# Patient Record
Sex: FEMALE | Race: WHITE | NOT HISPANIC OR LATINO | Employment: UNEMPLOYED | URBAN - METROPOLITAN AREA
[De-identification: names, ages, dates, MRNs, and addresses within clinical notes are randomized per-mention and may not be internally consistent; named-entity substitution may affect disease eponyms.]

---

## 2017-03-03 DIAGNOSIS — E78.2 MIXED HYPERLIPIDEMIA: Primary | ICD-10-CM

## 2017-03-03 DIAGNOSIS — E03.9 HYPOTHYROIDISM, UNSPECIFIED TYPE: ICD-10-CM

## 2017-03-07 LAB
ALBUMIN SERPL-MCNC: 4.4 G/DL (ref 3.5–5.2)
ALBUMIN/GLOB SERPL: 2 G/DL
ALP SERPL-CCNC: 52 U/L (ref 39–117)
ALT SERPL-CCNC: 22 U/L (ref 1–33)
AST SERPL-CCNC: 21 U/L (ref 1–32)
BILIRUB SERPL-MCNC: 0.3 MG/DL (ref 0.1–1.2)
BUN SERPL-MCNC: 20 MG/DL (ref 6–20)
BUN/CREAT SERPL: 24.1 (ref 7–25)
CALCIUM SERPL-MCNC: 9.6 MG/DL (ref 8.6–10.5)
CHLORIDE SERPL-SCNC: 101 MMOL/L (ref 98–107)
CHOLEST SERPL-MCNC: 197 MG/DL (ref 0–200)
CHOLEST/HDLC SERPL: 2.74 {RATIO}
CO2 SERPL-SCNC: 25.9 MMOL/L (ref 22–29)
CREAT SERPL-MCNC: 0.83 MG/DL (ref 0.57–1)
GLOBULIN SER CALC-MCNC: 2.2 GM/DL
GLUCOSE SERPL-MCNC: 117 MG/DL (ref 65–99)
HDLC SERPL-MCNC: 72 MG/DL (ref 40–60)
LDLC SERPL CALC-MCNC: 104 MG/DL (ref 0–100)
POTASSIUM SERPL-SCNC: 4.8 MMOL/L (ref 3.5–5.2)
PROT SERPL-MCNC: 6.6 G/DL (ref 6–8.5)
SODIUM SERPL-SCNC: 141 MMOL/L (ref 136–145)
T3FREE SERPL-MCNC: 2.9 PG/ML (ref 2–4.4)
T4 FREE SERPL-MCNC: 1.6 NG/DL (ref 0.93–1.7)
TRIGL SERPL-MCNC: 106 MG/DL (ref 0–150)
TSH SERPL DL<=0.005 MIU/L-ACNC: 0.44 MIU/ML (ref 0.27–4.2)
VLDLC SERPL CALC-MCNC: 21.2 MG/DL (ref 5–40)

## 2017-03-08 RX ORDER — OMEGA-3 ACID ETHYL ESTERS 1 G
CAPSULE ORAL
Qty: 360 CAPSULE | Refills: 0 | Status: SHIPPED | OUTPATIENT
Start: 2017-03-08 | End: 2017-07-01 | Stop reason: SDUPTHER

## 2017-03-08 RX ORDER — LISINOPRIL AND HYDROCHLOROTHIAZIDE 12.5; 1 MG/1; MG/1
TABLET ORAL
Qty: 90 TABLET | Refills: 0 | Status: SHIPPED | OUTPATIENT
Start: 2017-03-08 | End: 2017-08-11 | Stop reason: SDUPTHER

## 2017-03-10 ENCOUNTER — OFFICE VISIT (OUTPATIENT)
Dept: FAMILY MEDICINE CLINIC | Facility: CLINIC | Age: 57
End: 2017-03-10

## 2017-03-10 VITALS
WEIGHT: 214.8 LBS | HEART RATE: 71 BPM | HEIGHT: 62 IN | BODY MASS INDEX: 39.53 KG/M2 | SYSTOLIC BLOOD PRESSURE: 134 MMHG | DIASTOLIC BLOOD PRESSURE: 80 MMHG | OXYGEN SATURATION: 100 % | TEMPERATURE: 97.7 F

## 2017-03-10 DIAGNOSIS — E11.9 DIABETES MELLITUS WITHOUT COMPLICATION (HCC): ICD-10-CM

## 2017-03-10 DIAGNOSIS — E78.1 PRIMARY HYPERTRIGLYCERIDEMIA: ICD-10-CM

## 2017-03-10 DIAGNOSIS — I10 ESSENTIAL HYPERTENSION: ICD-10-CM

## 2017-03-10 DIAGNOSIS — E78.2 MIXED HYPERLIPIDEMIA: Primary | ICD-10-CM

## 2017-03-10 DIAGNOSIS — E03.8 OTHER SPECIFIED HYPOTHYROIDISM: ICD-10-CM

## 2017-03-10 PROCEDURE — 99214 OFFICE O/P EST MOD 30 MIN: CPT | Performed by: NURSE PRACTITIONER

## 2017-03-10 NOTE — PROGRESS NOTES
Subjective   Tatiana Turner is a 56 y.o. female.     History of Present Illness     6 month f/u    HLD:  LDL near optimum.  Livalo at the 4 mg dose is only statin she has been able to tolerate.    Hypothyroidism:  All levels wnl on current dose of Synthroid 75 mcg.    DM:  Fasting glucose 117.  Last A1-c 5.6%.      HTN:  Well controlled with medications.    Unfortunately, she has regained 16 lbs that she lost last year with the aid of Qsymia.  She is upset about this.  Would like to consider another medication to assist in her efforts.      The following portions of the patient's history were reviewed and updated as appropriate: allergies, current medications, past family history, past medical history, past social history, past surgical history and problem list.    Review of Systems   Respiratory: Negative.    Cardiovascular: Negative.    Psychiatric/Behavioral: Positive for dysphoric mood.   All other systems reviewed and are negative.      Objective   Physical Exam   Constitutional: She appears well-developed and well-nourished.   Cardiovascular: Normal rate, regular rhythm, S1 normal, S2 normal and normal heart sounds.    No murmur heard.  Pulses:       Carotid pulses are 2+ on the left side.  Pulmonary/Chest: Effort normal and breath sounds normal.   Neurological: She is alert.   Psychiatric: She has a normal mood and affect.   Nursing note and vitals reviewed.      Assessment/Plan   Tatiana was seen today for follow-up.    Diagnoses and all orders for this visit:    Mixed hyperlipidemia    Essential hypertension    Primary hypertriglyceridemia    Diabetes mellitus without complication    Other specified hypothyroidism    Other orders  -     naltrexone-bupropion ER (CONTRAVE) 8-90 MG tablet; Wk 1: 1 tab daily, Wk 2: 1 tab twice a day, Wk 3: 2 tabs in AM, 1 tab in PM, Wk 4: 2 tabs twice a day, Maintenance dose: 2 tabs twice daily.      I reviewed her lab results with her.  No changes at this time.    Discussed  options for weight loss meds.  She has chosen to try Contrave.  I discussed possible adverse side effects.      RTC 6 weeks for f/u weight loss.  RTC 6 months fbw/follow up appt

## 2017-04-17 RX ORDER — PITAVASTATIN CALCIUM 4.18 MG/1
TABLET, FILM COATED ORAL
Qty: 90 TABLET | Refills: 1 | Status: SHIPPED | OUTPATIENT
Start: 2017-04-17 | End: 2017-10-16 | Stop reason: SDUPTHER

## 2017-04-17 RX ORDER — FENOFIBRATE 145 MG/1
TABLET, COATED ORAL
Qty: 90 TABLET | Refills: 1 | Status: SHIPPED | OUTPATIENT
Start: 2017-04-17 | End: 2017-10-16 | Stop reason: SDUPTHER

## 2017-04-17 RX ORDER — FELODIPINE 2.5 MG/1
TABLET, EXTENDED RELEASE ORAL
Qty: 90 TABLET | Refills: 1 | Status: SHIPPED | OUTPATIENT
Start: 2017-04-17 | End: 2017-10-16 | Stop reason: SDUPTHER

## 2017-04-17 RX ORDER — METFORMIN HYDROCHLORIDE 500 MG/1
TABLET, EXTENDED RELEASE ORAL
Qty: 90 TABLET | Refills: 1 | Status: SHIPPED | OUTPATIENT
Start: 2017-04-17 | End: 2017-12-01 | Stop reason: SDUPTHER

## 2017-05-05 ENCOUNTER — OFFICE VISIT (OUTPATIENT)
Dept: FAMILY MEDICINE CLINIC | Facility: CLINIC | Age: 57
End: 2017-05-05

## 2017-05-05 VITALS
HEIGHT: 62 IN | OXYGEN SATURATION: 100 % | TEMPERATURE: 97.9 F | SYSTOLIC BLOOD PRESSURE: 124 MMHG | BODY MASS INDEX: 39.27 KG/M2 | HEART RATE: 68 BPM | DIASTOLIC BLOOD PRESSURE: 70 MMHG | WEIGHT: 213.4 LBS

## 2017-05-05 DIAGNOSIS — Z71.3 ENCOUNTER FOR WEIGHT LOSS COUNSELING: ICD-10-CM

## 2017-05-05 DIAGNOSIS — E66.9 OBESITY WITH BODY MASS INDEX OF 30.0-39.9: Primary | ICD-10-CM

## 2017-05-05 PROCEDURE — 99213 OFFICE O/P EST LOW 20 MIN: CPT | Performed by: NURSE PRACTITIONER

## 2017-05-05 RX ORDER — PHENDIMETRAZINE TARTRATE 35 MG/1
1 TABLET ORAL 2 TIMES DAILY
Qty: 60 EACH | Refills: 0 | Status: SHIPPED | OUTPATIENT
Start: 2017-05-05 | End: 2017-05-26 | Stop reason: SDUPTHER

## 2017-05-26 ENCOUNTER — OFFICE VISIT (OUTPATIENT)
Dept: FAMILY MEDICINE CLINIC | Facility: CLINIC | Age: 57
End: 2017-05-26

## 2017-05-26 VITALS
DIASTOLIC BLOOD PRESSURE: 76 MMHG | TEMPERATURE: 97.7 F | WEIGHT: 212 LBS | OXYGEN SATURATION: 99 % | BODY MASS INDEX: 39.01 KG/M2 | HEIGHT: 62 IN | SYSTOLIC BLOOD PRESSURE: 122 MMHG | HEART RATE: 72 BPM

## 2017-05-26 DIAGNOSIS — E66.01 MORBID OBESITY, UNSPECIFIED OBESITY TYPE (HCC): Primary | ICD-10-CM

## 2017-05-26 DIAGNOSIS — Z71.3 ENCOUNTER FOR WEIGHT LOSS COUNSELING: ICD-10-CM

## 2017-05-26 PROCEDURE — 99213 OFFICE O/P EST LOW 20 MIN: CPT | Performed by: NURSE PRACTITIONER

## 2017-05-26 RX ORDER — PHENDIMETRAZINE TARTRATE 35 MG/1
1 TABLET ORAL 2 TIMES DAILY
Qty: 60 EACH | Refills: 0 | Status: SHIPPED | OUTPATIENT
Start: 2017-05-26 | End: 2017-07-14

## 2017-07-03 RX ORDER — OMEGA-3 ACID ETHYL ESTERS 1 G
CAPSULE ORAL
Qty: 360 CAPSULE | Refills: 3 | Status: SHIPPED | OUTPATIENT
Start: 2017-07-03 | End: 2018-06-14

## 2017-07-03 RX ORDER — LEVOTHYROXINE SODIUM 75 MCG
TABLET ORAL
Qty: 90 TABLET | Refills: 3 | Status: SHIPPED | OUTPATIENT
Start: 2017-07-03 | End: 2017-12-01 | Stop reason: SDUPTHER

## 2017-07-14 ENCOUNTER — OFFICE VISIT (OUTPATIENT)
Dept: FAMILY MEDICINE CLINIC | Facility: CLINIC | Age: 57
End: 2017-07-14

## 2017-07-14 ENCOUNTER — TELEPHONE (OUTPATIENT)
Dept: FAMILY MEDICINE CLINIC | Facility: CLINIC | Age: 57
End: 2017-07-14

## 2017-07-14 ENCOUNTER — HOSPITAL ENCOUNTER (OUTPATIENT)
Dept: GENERAL RADIOLOGY | Facility: HOSPITAL | Age: 57
Discharge: HOME | End: 2017-07-14
Admitting: NURSE PRACTITIONER

## 2017-07-14 ENCOUNTER — TRANSCRIBE ORDERS (OUTPATIENT)
Dept: ADMINISTRATIVE | Facility: HOSPITAL | Age: 57
End: 2017-07-14

## 2017-07-14 VITALS
BODY MASS INDEX: 39.01 KG/M2 | HEART RATE: 67 BPM | HEIGHT: 62 IN | DIASTOLIC BLOOD PRESSURE: 80 MMHG | SYSTOLIC BLOOD PRESSURE: 124 MMHG | WEIGHT: 212 LBS | TEMPERATURE: 97.8 F | OXYGEN SATURATION: 99 %

## 2017-07-14 DIAGNOSIS — M79.672 LEFT FOOT PAIN: ICD-10-CM

## 2017-07-14 DIAGNOSIS — E66.9 OBESITY WITH BODY MASS INDEX OF 30.0-39.9: Primary | ICD-10-CM

## 2017-07-14 DIAGNOSIS — E04.1 THYROID NODULE: Primary | ICD-10-CM

## 2017-07-14 DIAGNOSIS — Z71.3 ENCOUNTER FOR WEIGHT LOSS COUNSELING: ICD-10-CM

## 2017-07-14 DIAGNOSIS — M77.32 CALCANEAL SPUR OF FOOT, LEFT: Primary | ICD-10-CM

## 2017-07-14 DIAGNOSIS — M79.672 PAIN OF LEFT HEEL: ICD-10-CM

## 2017-07-14 PROCEDURE — 99213 OFFICE O/P EST LOW 20 MIN: CPT | Performed by: NURSE PRACTITIONER

## 2017-07-14 PROCEDURE — 73630 X-RAY EXAM OF FOOT: CPT

## 2017-07-14 RX ORDER — MELOXICAM 15 MG/1
15 TABLET ORAL DAILY
Qty: 30 TABLET | Refills: 0 | Status: SHIPPED | OUTPATIENT
Start: 2017-07-14 | End: 2018-06-14

## 2017-07-14 NOTE — TELEPHONE ENCOUNTER
----- Message from JOSE Cortez sent at 7/14/2017  3:45 PM EDT -----  Call pt.  X-ray did show a bone spur on her heel bone.  I am referring her to orthopedics.

## 2017-07-14 NOTE — PROGRESS NOTES
Subjective   Tatiana Turner is a 56 y.o. female.     History of Present Illness       Here for weight loss management.  She has not lost any weight at all on appetite suppressant phendimetrazine.  She has tried/failed on Qsymia (all doses), Contrave, and now phendimetrazine.      C/o 3 day hx left heel pain.  No known injury.  Taking aleve once day--having no effect on pain.      The following portions of the patient's history were reviewed and updated as appropriate: allergies, current medications, past family history, past medical history, past social history, past surgical history and problem list.    Review of Systems   Constitutional: Negative for unexpected weight change.   Musculoskeletal:        Left heel pain x 3 days   All other systems reviewed and are negative.      Objective   Physical Exam   Constitutional: Vital signs are normal. She appears well-developed and well-nourished.  Non-toxic appearance.   Cardiovascular: Normal rate.    Pulmonary/Chest: Effort normal.   Musculoskeletal:        Left foot: There is tenderness. There is normal range of motion, no swelling and no deformity.   Tender to palpation later aspects of left heel and over calcaneous.  Mildly antalgic gait.     Neurological: She is alert.   Psychiatric: She has a normal mood and affect.   Nursing note and vitals reviewed.      Assessment/Plan   Tatiana was seen today for follow-up.    Diagnoses and all orders for this visit:    Obesity with body mass index of 30.0-39.9    Encounter for weight loss counseling    Left foot pain  -     XR Foot 3+ View Left; Future    Other orders  -     meloxicam (MOBIC) 15 MG tablet; Take 1 tablet by mouth Daily. 1 po q day with a meal        D/c Contrave.  I explained to patient I have nothing more to offer her for weight loss.  She has declined referral to bariatrics in past--still declines.  I recommended weight watchers.      Pending x-ray results will refer to ortho or have her f/u with me--which  ever is appropriate.

## 2017-08-11 RX ORDER — LISINOPRIL AND HYDROCHLOROTHIAZIDE 12.5; 1 MG/1; MG/1
TABLET ORAL
Qty: 90 TABLET | Refills: 3 | Status: SHIPPED | OUTPATIENT
Start: 2017-08-11 | End: 2017-12-01 | Stop reason: SDUPTHER

## 2017-08-22 RX ORDER — BUDESONIDE AND FORMOTEROL FUMARATE DIHYDRATE 80; 4.5 UG/1; UG/1
AEROSOL RESPIRATORY (INHALATION)
Qty: 6.9 G | Refills: 3 | Status: SHIPPED | OUTPATIENT
Start: 2017-08-22 | End: 2017-12-01 | Stop reason: SDUPTHER

## 2017-09-06 DIAGNOSIS — E78.5 HYPERLIPIDEMIA, UNSPECIFIED HYPERLIPIDEMIA TYPE: ICD-10-CM

## 2017-09-06 DIAGNOSIS — E11.51 DIABETES MELLITUS WITH PERIPHERAL VASCULAR DISEASE (HCC): Primary | ICD-10-CM

## 2017-09-06 DIAGNOSIS — E55.9 VITAMIN D DEFICIENCY: ICD-10-CM

## 2017-09-06 DIAGNOSIS — E03.9 HYPOTHYROIDISM, UNSPECIFIED TYPE: Primary | ICD-10-CM

## 2017-09-11 ENCOUNTER — RESULTS ENCOUNTER (OUTPATIENT)
Dept: FAMILY MEDICINE CLINIC | Facility: CLINIC | Age: 57
End: 2017-09-11

## 2017-09-11 DIAGNOSIS — E55.9 VITAMIN D DEFICIENCY: ICD-10-CM

## 2017-09-11 DIAGNOSIS — E11.51 DIABETES MELLITUS WITH PERIPHERAL VASCULAR DISEASE (HCC): ICD-10-CM

## 2017-09-11 DIAGNOSIS — E03.9 HYPOTHYROIDISM, UNSPECIFIED TYPE: ICD-10-CM

## 2017-09-11 DIAGNOSIS — E78.5 HYPERLIPIDEMIA, UNSPECIFIED HYPERLIPIDEMIA TYPE: ICD-10-CM

## 2017-09-12 LAB
25(OH)D3+25(OH)D2 SERPL-MCNC: 49.3 NG/ML (ref 30–100)
ALBUMIN SERPL-MCNC: 4.5 G/DL (ref 3.5–5.2)
ALBUMIN/GLOB SERPL: 2 G/DL
ALP SERPL-CCNC: 49 U/L (ref 39–117)
ALT SERPL-CCNC: 26 U/L (ref 1–33)
AST SERPL-CCNC: 25 U/L (ref 1–32)
BILIRUB SERPL-MCNC: 0.4 MG/DL (ref 0.1–1.2)
BUN SERPL-MCNC: 21 MG/DL (ref 6–20)
BUN/CREAT SERPL: 22.6 (ref 7–25)
CALCIUM SERPL-MCNC: 10 MG/DL (ref 8.6–10.5)
CHLORIDE SERPL-SCNC: 101 MMOL/L (ref 98–107)
CHOLEST SERPL-MCNC: 178 MG/DL (ref 0–200)
CHOLEST/HDLC SERPL: 2.54 {RATIO}
CO2 SERPL-SCNC: 27.9 MMOL/L (ref 22–29)
CREAT SERPL-MCNC: 0.93 MG/DL (ref 0.57–1)
GLOBULIN SER CALC-MCNC: 2.3 GM/DL
GLUCOSE SERPL-MCNC: 116 MG/DL (ref 65–99)
HBA1C MFR BLD: 5.92 % (ref 4.8–5.6)
HDLC SERPL-MCNC: 70 MG/DL (ref 40–60)
LDLC SERPL CALC-MCNC: 81 MG/DL (ref 0–100)
POTASSIUM SERPL-SCNC: 4.8 MMOL/L (ref 3.5–5.2)
PROT SERPL-MCNC: 6.8 G/DL (ref 6–8.5)
SODIUM SERPL-SCNC: 141 MMOL/L (ref 136–145)
T3FREE SERPL-MCNC: 2.5 PG/ML (ref 2–4.4)
T4 FREE SERPL-MCNC: 1.34 NG/DL (ref 0.93–1.7)
TRIGL SERPL-MCNC: 135 MG/DL (ref 0–150)
TSH SERPL DL<=0.005 MIU/L-ACNC: 4.91 MIU/ML (ref 0.27–4.2)
VLDLC SERPL CALC-MCNC: 27 MG/DL (ref 5–40)

## 2017-09-14 ENCOUNTER — OFFICE VISIT (OUTPATIENT)
Dept: FAMILY MEDICINE CLINIC | Facility: CLINIC | Age: 57
End: 2017-09-14

## 2017-09-14 VITALS
BODY MASS INDEX: 39.93 KG/M2 | DIASTOLIC BLOOD PRESSURE: 78 MMHG | OXYGEN SATURATION: 99 % | WEIGHT: 217 LBS | HEART RATE: 56 BPM | HEIGHT: 62 IN | SYSTOLIC BLOOD PRESSURE: 124 MMHG | TEMPERATURE: 97.9 F

## 2017-09-14 DIAGNOSIS — I10 ESSENTIAL HYPERTENSION: ICD-10-CM

## 2017-09-14 DIAGNOSIS — E11.9 DIABETES MELLITUS WITHOUT COMPLICATION (HCC): ICD-10-CM

## 2017-09-14 DIAGNOSIS — E66.9 OBESITY WITH BODY MASS INDEX OF 30.0-39.9: ICD-10-CM

## 2017-09-14 DIAGNOSIS — E78.5 HYPERLIPIDEMIA, UNSPECIFIED HYPERLIPIDEMIA TYPE: Primary | ICD-10-CM

## 2017-09-14 DIAGNOSIS — J45.40 MODERATE PERSISTENT ASTHMA WITHOUT COMPLICATION: ICD-10-CM

## 2017-09-14 DIAGNOSIS — E03.8 OTHER SPECIFIED HYPOTHYROIDISM: ICD-10-CM

## 2017-09-14 DIAGNOSIS — E78.1 PRIMARY HYPERTRIGLYCERIDEMIA: ICD-10-CM

## 2017-09-14 PROCEDURE — 99214 OFFICE O/P EST MOD 30 MIN: CPT | Performed by: NURSE PRACTITIONER

## 2017-09-14 NOTE — PROGRESS NOTES
Subjective   Tatiana Turner is a 56 y.o. female.     History of Present Illness     6 month f/u.    HLD, HTN, DM all stable on medications.  Hypothyroidism:  TSH elevated.  Admits to not taking synthroid consistently.  Asthma:  Stable.  Taking symbicort.     Obesity:  Weight up 5 lbs.  BMI 39.69.    The following portions of the patient's history were reviewed and updated as appropriate: allergies, current medications, past family history, past medical history, past social history, past surgical history and problem list.    Review of Systems   Respiratory: Negative.    Cardiovascular: Negative.    All other systems reviewed and are negative.      Objective   Physical Exam   Constitutional: Vital signs are normal. She appears well-developed and well-nourished.   Cardiovascular: Normal rate, regular rhythm, S1 normal, S2 normal and normal heart sounds.    No murmur heard.  Pulses:       Carotid pulses are 2+ on the right side, and 2+ on the left side.  No carotid bruits   Pulmonary/Chest: Effort normal and breath sounds normal.   Neurological: She is alert.   Psychiatric: She has a normal mood and affect.   Nursing note and vitals reviewed.      Assessment/Plan   Tatiana was seen today for follow-up.    Diagnoses and all orders for this visit:    Hyperlipidemia, unspecified hyperlipidemia type    Essential hypertension    Primary hypertriglyceridemia    Diabetes mellitus without complication    Other specified hypothyroidism    Moderate persistent asthma without complication    Obesity with body mass index of 30.0-39.9        I reviewed her lab results with her.  Again, discussed weight loss, diet/exercise.    Advised to take synthroid daily/consistently.    RTC 6 months    Adv to get flu vaccine.

## 2017-10-13 ENCOUNTER — HOSPITAL ENCOUNTER (OUTPATIENT)
Dept: ULTRASOUND IMAGING | Facility: HOSPITAL | Age: 57
Discharge: HOME | End: 2017-10-13
Attending: OTOLARYNGOLOGY | Admitting: OTOLARYNGOLOGY

## 2017-10-13 DIAGNOSIS — E04.1 THYROID NODULE: ICD-10-CM

## 2017-10-13 PROCEDURE — 76536 US EXAM OF HEAD AND NECK: CPT

## 2017-10-13 RX ORDER — OMEGA-3-ACID ETHYL ESTERS 1 G/1
2 CAPSULE, LIQUID FILLED ORAL 2 TIMES DAILY
Qty: 360 CAPSULE | Refills: 1 | Status: SHIPPED | OUTPATIENT
Start: 2017-10-13 | End: 2017-12-01 | Stop reason: SDUPTHER

## 2017-10-17 RX ORDER — FENOFIBRATE 145 MG/1
TABLET, COATED ORAL
Qty: 90 TABLET | Refills: 2 | Status: SHIPPED | OUTPATIENT
Start: 2017-10-17 | End: 2017-12-01 | Stop reason: SDUPTHER

## 2017-10-17 RX ORDER — FELODIPINE 2.5 MG/1
TABLET, EXTENDED RELEASE ORAL
Qty: 90 TABLET | Refills: 2 | Status: SHIPPED | OUTPATIENT
Start: 2017-10-17 | End: 2017-12-01 | Stop reason: SDUPTHER

## 2017-10-17 RX ORDER — PITAVASTATIN CALCIUM 4.18 MG/1
TABLET, FILM COATED ORAL
Qty: 90 TABLET | Refills: 2 | Status: SHIPPED | OUTPATIENT
Start: 2017-10-17 | End: 2017-12-01 | Stop reason: SDUPTHER

## 2017-10-24 ENCOUNTER — CLINICAL SUPPORT (OUTPATIENT)
Dept: FAMILY MEDICINE CLINIC | Facility: CLINIC | Age: 57
End: 2017-10-24

## 2017-10-24 PROCEDURE — 90686 IIV4 VACC NO PRSV 0.5 ML IM: CPT | Performed by: NURSE PRACTITIONER

## 2017-10-24 PROCEDURE — 90471 IMMUNIZATION ADMIN: CPT | Performed by: NURSE PRACTITIONER

## 2017-11-09 ENCOUNTER — APPOINTMENT (OUTPATIENT)
Dept: WOMENS IMAGING | Facility: HOSPITAL | Age: 57
End: 2017-11-09

## 2017-11-09 PROCEDURE — 77067 SCR MAMMO BI INCL CAD: CPT | Performed by: RADIOLOGY

## 2017-11-09 PROCEDURE — 77063 BREAST TOMOSYNTHESIS BI: CPT | Performed by: RADIOLOGY

## 2017-12-01 ENCOUNTER — OFFICE VISIT (OUTPATIENT)
Dept: FAMILY MEDICINE CLINIC | Facility: CLINIC | Age: 57
End: 2017-12-01

## 2017-12-01 VITALS
DIASTOLIC BLOOD PRESSURE: 76 MMHG | HEART RATE: 74 BPM | OXYGEN SATURATION: 99 % | HEIGHT: 62 IN | SYSTOLIC BLOOD PRESSURE: 124 MMHG | TEMPERATURE: 98.2 F | WEIGHT: 214 LBS | BODY MASS INDEX: 39.38 KG/M2

## 2017-12-01 DIAGNOSIS — E78.1 PRIMARY HYPERTRIGLYCERIDEMIA: ICD-10-CM

## 2017-12-01 DIAGNOSIS — I10 ESSENTIAL HYPERTENSION: Primary | ICD-10-CM

## 2017-12-01 DIAGNOSIS — E78.5 HYPERLIPIDEMIA, UNSPECIFIED HYPERLIPIDEMIA TYPE: ICD-10-CM

## 2017-12-01 DIAGNOSIS — J45.40 MODERATE PERSISTENT ASTHMA, UNSPECIFIED WHETHER COMPLICATED: ICD-10-CM

## 2017-12-01 PROCEDURE — 99213 OFFICE O/P EST LOW 20 MIN: CPT | Performed by: NURSE PRACTITIONER

## 2017-12-01 RX ORDER — LISINOPRIL AND HYDROCHLOROTHIAZIDE 12.5; 1 MG/1; MG/1
1 TABLET ORAL DAILY
Qty: 90 TABLET | Refills: 3 | Status: SHIPPED | OUTPATIENT
Start: 2017-12-01 | End: 2018-07-25 | Stop reason: SDUPTHER

## 2017-12-01 RX ORDER — LEVOTHYROXINE SODIUM 75 MCG
75 TABLET ORAL DAILY
Qty: 90 TABLET | Refills: 3 | Status: SHIPPED | OUTPATIENT
Start: 2017-12-01 | End: 2018-06-25

## 2017-12-01 RX ORDER — OMEGA-3-ACID ETHYL ESTERS 1 G/1
2 CAPSULE, LIQUID FILLED ORAL 2 TIMES DAILY
Qty: 360 CAPSULE | Refills: 3 | Status: SHIPPED | OUTPATIENT
Start: 2017-12-01 | End: 2018-07-25 | Stop reason: SDUPTHER

## 2017-12-01 RX ORDER — METFORMIN HYDROCHLORIDE 500 MG/1
500 TABLET, EXTENDED RELEASE ORAL
Qty: 90 TABLET | Refills: 1 | Status: SHIPPED | OUTPATIENT
Start: 2017-12-01 | End: 2018-07-25 | Stop reason: SDUPTHER

## 2017-12-01 RX ORDER — FELODIPINE 2.5 MG/1
2.5 TABLET, EXTENDED RELEASE ORAL DAILY
Qty: 90 TABLET | Refills: 3 | Status: SHIPPED | OUTPATIENT
Start: 2017-12-01 | End: 2018-06-14

## 2017-12-01 RX ORDER — BUDESONIDE AND FORMOTEROL FUMARATE DIHYDRATE 80; 4.5 UG/1; UG/1
AEROSOL RESPIRATORY (INHALATION)
Qty: 3 INHALER | Refills: 3 | Status: SHIPPED | OUTPATIENT
Start: 2017-12-01 | End: 2018-07-25 | Stop reason: SDUPTHER

## 2017-12-01 RX ORDER — FENOFIBRATE 145 MG/1
145 TABLET, COATED ORAL DAILY
Qty: 90 TABLET | Refills: 3 | Status: SHIPPED | OUTPATIENT
Start: 2017-12-01 | End: 2018-06-14

## 2017-12-01 NOTE — PROGRESS NOTES
Subjective   Tatiana Turner is a 57 y.o. female.     History of Present Illness   Here for med refills.  Labs are up to date.    Doing well.    The following portions of the patient's history were reviewed and updated as appropriate: allergies, current medications, past family history, past medical history, past social history, past surgical history and problem list.    Review of Systems   Respiratory: Negative.    Cardiovascular: Negative.    Endocrine: Negative.    All other systems reviewed and are negative.      Objective   Physical Exam   Constitutional: Vital signs are normal. She appears well-developed and well-nourished.  Non-toxic appearance.   Cardiovascular: Normal rate.    Pulmonary/Chest: Effort normal.   Neurological: She is alert.   Psychiatric: She has a normal mood and affect.   Nursing note and vitals reviewed.      Assessment/Plan   Tatiana was seen today for follow-up.    Diagnoses and all orders for this visit:    Essential hypertension    Hyperlipidemia, unspecified hyperlipidemia type    Primary hypertriglyceridemia    Moderate persistent asthma, unspecified whether complicated    Other orders  -     omega-3 acid ethyl esters (LOVAZA) 1 g capsule; Take 2 capsules by mouth 2 (Two) Times a Day.  -     budesonide-formoterol (SYMBICORT) 80-4.5 MCG/ACT inhaler; Symbicort 80-4.5 MCG/ACT Inhalation Aerosol inhale 2 puffs twice daily; 3; 3; 01-Apr-2014; Active  -     fenofibrate (TRICOR) 145 MG tablet; Take 1 tablet by mouth Daily.  -     lisinopril-hydrochlorothiazide (PRINZIDE,ZESTORETIC) 10-12.5 MG per tablet; Take 1 tablet by mouth Daily.  -     Pitavastatin Calcium (LIVALO) 4 MG tablet; Take 1 tablet by mouth Daily.  -     felodipine (PLENDIL) 2.5 MG 24 hr tablet; Take 1 tablet by mouth Daily.  -     metFORMIN ER (GLUCOPHAGE-XR) 500 MG 24 hr tablet; Take 1 tablet by mouth Daily With Breakfast.  -     SYNTHROID 75 MCG tablet; Take 1 tablet by mouth Daily.

## 2018-06-14 ENCOUNTER — TELEPHONE (OUTPATIENT)
Dept: FAMILY MEDICINE CLINIC | Facility: CLINIC | Age: 58
End: 2018-06-14

## 2018-06-14 ENCOUNTER — OFFICE VISIT (OUTPATIENT)
Dept: FAMILY MEDICINE CLINIC | Facility: CLINIC | Age: 58
End: 2018-06-14

## 2018-06-14 VITALS
TEMPERATURE: 98 F | HEART RATE: 73 BPM | SYSTOLIC BLOOD PRESSURE: 154 MMHG | WEIGHT: 233 LBS | HEIGHT: 62 IN | OXYGEN SATURATION: 98 % | DIASTOLIC BLOOD PRESSURE: 90 MMHG | BODY MASS INDEX: 42.88 KG/M2

## 2018-06-14 DIAGNOSIS — IMO0001 CLASS 3 OBESITY DUE TO EXCESS CALORIES WITH SERIOUS COMORBIDITY AND BODY MASS INDEX (BMI) OF 40.0 TO 44.9 IN ADULT: Primary | ICD-10-CM

## 2018-06-14 DIAGNOSIS — Z12.2 ENCOUNTER FOR SCREENING FOR LUNG CANCER: ICD-10-CM

## 2018-06-14 DIAGNOSIS — E03.8 OTHER SPECIFIED HYPOTHYROIDISM: ICD-10-CM

## 2018-06-14 DIAGNOSIS — E04.1 THYROID NODULE: ICD-10-CM

## 2018-06-14 DIAGNOSIS — Z87.891 HISTORY OF NICOTINE DEPENDENCE: ICD-10-CM

## 2018-06-14 DIAGNOSIS — E11.9 DIABETES MELLITUS WITHOUT COMPLICATION (HCC): ICD-10-CM

## 2018-06-14 DIAGNOSIS — I10 ESSENTIAL HYPERTENSION: ICD-10-CM

## 2018-06-14 DIAGNOSIS — E55.9 HYPOVITAMINOSIS D: ICD-10-CM

## 2018-06-14 PROBLEM — M72.2 PLANTAR FASCIITIS: Status: ACTIVE | Noted: 2018-06-14

## 2018-06-14 PROBLEM — M79.672 LEFT FOOT PAIN: Status: RESOLVED | Noted: 2017-07-14 | Resolved: 2018-06-14

## 2018-06-14 LAB
25(OH)D3+25(OH)D2 SERPL-MCNC: 50.2 NG/ML (ref 30–100)
BUN SERPL-MCNC: 18 MG/DL (ref 6–20)
BUN/CREAT SERPL: 21.4 (ref 7–25)
CALCIUM SERPL-MCNC: 9.8 MG/DL (ref 8.6–10.5)
CHLORIDE SERPL-SCNC: 103 MMOL/L (ref 98–107)
CO2 SERPL-SCNC: 27.5 MMOL/L (ref 22–29)
CREAT SERPL-MCNC: 0.84 MG/DL (ref 0.57–1)
GFR SERPLBLD CREATININE-BSD FMLA CKD-EPI: 70 ML/MIN/1.73
GFR SERPLBLD CREATININE-BSD FMLA CKD-EPI: 85 ML/MIN/1.73
GLUCOSE SERPL-MCNC: 123 MG/DL (ref 65–99)
HBA1C MFR BLD: 6.3 % (ref 4.8–5.6)
POTASSIUM SERPL-SCNC: 4.4 MMOL/L (ref 3.5–5.2)
SODIUM SERPL-SCNC: 145 MMOL/L (ref 136–145)
T4 FREE SERPL-MCNC: 1.32 NG/DL (ref 0.93–1.7)
TSH SERPL DL<=0.005 MIU/L-ACNC: 4.65 MIU/ML (ref 0.27–4.2)

## 2018-06-14 PROCEDURE — 99214 OFFICE O/P EST MOD 30 MIN: CPT | Performed by: FAMILY MEDICINE

## 2018-06-14 NOTE — TELEPHONE ENCOUNTER
I called pt insurance to see about coverage on CT Chest Low Dose. The plan does not cover the test with dx code (screening for lung cancer), that is the code that is required to be used for this test, therefore, pt insurance will not pay for it. Pt is aware and she has decided that she does not want this test anymore. Test cancelled.

## 2018-06-14 NOTE — PROGRESS NOTES
Tatiana Turner is a 57 y.o. female.     Chief Complaint   Patient presents with   • Hypertension     bp is runnig higher than norml due gained weight    • Hyperlipidemia     follow up  no complinas   • Hypothyroidism     follow up pt last time has thryroid number were bad would like recheck        HPI     Pt is a pleasant 57 y.o. YO female here for hypothyroidism, HLD, CAD, HTN and carotid artery disease.  PMH includes Carotid artery stenosis (FU by vascular), type 2 diabetes well controlled, hypertension well-controlled, hypothyroidism well-controlled, thyroid nodule stable Followed by ENT (secor), obesity, COPD (Seen by [facility]), hyperlipidemia.    Pressure well controlled, asymptomatic    Hyperlipidemia not well controlled.  She has not been able to tolerate statins except for low-dose pravastatin.  Vascular recommended baby aspirin, statin and diet at exercise.    Hypothyroidism well controlled, last TSH was in September and was normal.    ECG worsening, gained about 20 pounds in the last 3 years.  She does drink a beer nightly, carbs regularly especially test in the morning or potatoes with steak weekly.    The following portions of the patient's history were reviewed and updated as appropriate: allergies, current medications, past family history, past medical history, past social history, past surgical history and problem list.    Review of Systems   Constitutional: Negative for fever and unexpected weight change.   HENT: Negative for dental problem.    Respiratory: Negative for shortness of breath.    Cardiovascular: Negative for chest pain.   Gastrointestinal: Negative for blood in stool.   Genitourinary: Negative for dysuria.   Skin: Negative for rash.   Allergic/Immunologic: Negative for environmental allergies.   Neurological: Negative for syncope.   Psychiatric/Behavioral: The patient is not nervous/anxious.    All other systems reviewed and are negative.      Objective  Vitals:    06/14/18 1017   BP:  154/90   Pulse: 73   Temp: 98 °F (36.7 °C)   SpO2: 98%        Physical Exam   Constitutional: She is oriented to person, place, and time. She appears well-developed and well-nourished. No distress.   HENT:   Head: Normocephalic.   Nose: Nose normal.   Eyes: EOM are normal.   Cardiovascular: Normal rate, regular rhythm, normal heart sounds and intact distal pulses.    No murmur heard.  Pulmonary/Chest: Effort normal and breath sounds normal. No respiratory distress.   Musculoskeletal: Normal range of motion.   Neurological: She is alert and oriented to person, place, and time.   Skin: Skin is warm and dry. No rash noted.   Psychiatric: She has a normal mood and affect. Her behavior is normal. Judgment and thought content normal.   Nursing note and vitals reviewed.        Current Outpatient Prescriptions:   •  aspirin 81 MG tablet, Take 1 tablet by mouth daily., Disp: , Rfl:   •  budesonide-formoterol (SYMBICORT) 80-4.5 MCG/ACT inhaler, Symbicort 80-4.5 MCG/ACT Inhalation Aerosol inhale 2 puffs twice daily; 3; 3; 01-Apr-2014; Active, Disp: 3 inhaler, Rfl: 3  •  calcium citrate-vitamin D (CITRACAL+D) 315-200 MG-UNIT per tablet, Take 1 tablet by mouth daily., Disp: , Rfl:   •  Cholecalciferol (VITAMIN D) 1000 UNITS capsule, Take by mouth. Take as directed, Disp: , Rfl:   •  estradiol (VAGIFEM) 10 MCG tablet vaginal tablet, Insert into the vagina., Disp: , Rfl:   •  glucose blood test strip, daily. Use 1 strip daily, Disp: , Rfl:   •  Lancets (FREESTYLE) lancets, Use as directed, Disp: , Rfl:   •  lisinopril-hydrochlorothiazide (PRINZIDE,ZESTORETIC) 10-12.5 MG per tablet, Take 1 tablet by mouth Daily., Disp: 90 tablet, Rfl: 3  •  metFORMIN ER (GLUCOPHAGE-XR) 500 MG 24 hr tablet, Take 1 tablet by mouth Daily With Breakfast., Disp: 90 tablet, Rfl: 1  •  omega-3 acid ethyl esters (LOVAZA) 1 g capsule, Take 2 capsules by mouth 2 (Two) Times a Day., Disp: 360 capsule, Rfl: 3  •  Pitavastatin Calcium (LIVALO) 4 MG tablet, Take  1 tablet by mouth Daily., Disp: 90 tablet, Rfl: 3  •  SYNTHROID 75 MCG tablet, Take 1 tablet by mouth Daily., Disp: 90 tablet, Rfl: 3    Procedures    Lab Results (most recent)     None              Tatiana was seen today for hypertension, hyperlipidemia and hypothyroidism.    Diagnoses and all orders for this visit:    Class 3 obesity due to excess calories with serious comorbidity and body mass index (BMI) of 40.0 to 44.9 in adult    Diabetes mellitus without complication  -     Hemoglobin A1c  -     Basic Metabolic Panel    Essential hypertension  -     Basic Metabolic Panel    Other specified hypothyroidism  -     TSH  -     T4, free    Thyroid nodule  -     TSH  -     T4, free    Encounter for screening for lung cancer  -     CT chest low dose wo; Future    History of nicotine dependence    Hypovitaminosis D  -     Vitamin D 25 Hydroxy    Shows a pleasant 57-year-old female new to this office in new to me.  She is due for laboratory work as above.  We discussed diet and exercise, plan to eat a lower carb diet and increase activity to exercising 4 times weekly.  She also has a beer nightly, we discussed that this has lots of sugar.  Plan to decrease beer consumption.  Follow labs above.  CT scan for lung cancer screening as well.      Return in 1 month (on 7/14/2018), or if symptoms worsen or fail to improve, for Recheck htn obesity .      Dawna Abarca MD

## 2018-06-25 RX ORDER — LEVOTHYROXINE SODIUM 0.1 MG/1
100 TABLET ORAL DAILY
Qty: 90 TABLET | Refills: 3 | Status: SHIPPED | OUTPATIENT
Start: 2018-06-25 | End: 2018-08-27 | Stop reason: SDUPTHER

## 2018-07-13 ENCOUNTER — OFFICE VISIT (OUTPATIENT)
Dept: FAMILY MEDICINE CLINIC | Facility: CLINIC | Age: 58
End: 2018-07-13

## 2018-07-13 VITALS
WEIGHT: 236 LBS | TEMPERATURE: 98.5 F | DIASTOLIC BLOOD PRESSURE: 84 MMHG | BODY MASS INDEX: 43.43 KG/M2 | OXYGEN SATURATION: 98 % | HEIGHT: 62 IN | SYSTOLIC BLOOD PRESSURE: 136 MMHG | HEART RATE: 78 BPM

## 2018-07-13 DIAGNOSIS — E03.8 OTHER SPECIFIED HYPOTHYROIDISM: Primary | ICD-10-CM

## 2018-07-13 DIAGNOSIS — I10 ESSENTIAL HYPERTENSION: ICD-10-CM

## 2018-07-13 DIAGNOSIS — IMO0001 CLASS 3 OBESITY DUE TO EXCESS CALORIES WITH SERIOUS COMORBIDITY AND BODY MASS INDEX (BMI) OF 40.0 TO 44.9 IN ADULT: ICD-10-CM

## 2018-07-13 PROCEDURE — 99214 OFFICE O/P EST MOD 30 MIN: CPT | Performed by: FAMILY MEDICINE

## 2018-07-13 NOTE — PROGRESS NOTES
Tatiana Turner is a 57 y.o. female.     Chief Complaint   Patient presents with   • Hypertension     follow up is doing better bp with new higher dose   • Hypothyroidism   • Obesity       HPI     Pt is a pleasant 57 y.o. YO female here for HTN, hypothyroidism  and FU obesity.  PMH includes hypothyroidism, HLD, CAD, HTN and carotid artery disease.    HTN: controlled  - Currently treated with HCTZ Lisinopril   - Diagnosed years ago  - Asymptomatic, no chest pain, palpitation or SOA  - Last Cr of 0.8 6/2018  - Lifestyle measures atempted:     Hypothyroidism: TSH elevated at 4.6 6/4 and increased dose of levothyroxine.  She was feeling fatigued and had her dose increased to 100 mcg with improvement.  Due for repeat TSH in 4 weeks.    Obesity:  - Worsening, especially in the summer, she does not get out when it is hot.  She usually walks.  Her diet has not been very adherent.  She has intentions to lose weight.    The following portions of the patient's history were reviewed and updated as appropriate: allergies, current medications, past family history, past medical history, past social history, past surgical history and problem list.    Review of Systems   Constitutional: Negative for fever and unexpected weight change.   HENT: Negative for dental problem.    Respiratory: Negative for shortness of breath.    Cardiovascular: Negative for chest pain.   Gastrointestinal: Negative for blood in stool.   Genitourinary: Negative for dysuria.   Skin: Negative for rash.   Allergic/Immunologic: Negative for environmental allergies.   Neurological: Negative for syncope.   Psychiatric/Behavioral: The patient is not nervous/anxious.    All other systems reviewed and are negative.      Objective  Vitals:    07/13/18 1031   BP: 136/84   Pulse: 78   Temp: 98.5 °F (36.9 °C)   SpO2: 98%        Physical Exam   Constitutional: She is oriented to person, place, and time. She appears well-developed and well-nourished. No distress.    HENT:   Head: Normocephalic.   Nose: Nose normal.   Eyes: EOM are normal.   Cardiovascular: Normal rate, regular rhythm, normal heart sounds and intact distal pulses.    No murmur heard.  Pulmonary/Chest: Effort normal and breath sounds normal. No respiratory distress.   Musculoskeletal: Normal range of motion.   Neurological: She is alert and oriented to person, place, and time.   Skin: Skin is warm and dry. No rash noted.   Psychiatric: She has a normal mood and affect. Her behavior is normal. Judgment and thought content normal.   Nursing note and vitals reviewed.        Current Outpatient Prescriptions:   •  aspirin 81 MG tablet, Take 1 tablet by mouth daily., Disp: , Rfl:   •  budesonide-formoterol (SYMBICORT) 80-4.5 MCG/ACT inhaler, Symbicort 80-4.5 MCG/ACT Inhalation Aerosol inhale 2 puffs twice daily; 3; 3; 01-Apr-2014; Active, Disp: 3 inhaler, Rfl: 3  •  calcium citrate-vitamin D (CITRACAL+D) 315-200 MG-UNIT per tablet, Take 1 tablet by mouth daily., Disp: , Rfl:   •  Cholecalciferol (VITAMIN D) 1000 UNITS capsule, Take by mouth. Take as directed, Disp: , Rfl:   •  estradiol (VAGIFEM) 10 MCG tablet vaginal tablet, Insert into the vagina., Disp: , Rfl:   •  glucose blood test strip, daily. Use 1 strip daily, Disp: , Rfl:   •  Lancets (FREESTYLE) lancets, Use as directed, Disp: , Rfl:   •  levothyroxine (SYNTHROID) 100 MCG tablet, Take 1 tablet by mouth Daily., Disp: 90 tablet, Rfl: 3  •  lisinopril-hydrochlorothiazide (PRINZIDE,ZESTORETIC) 10-12.5 MG per tablet, Take 1 tablet by mouth Daily., Disp: 90 tablet, Rfl: 3  •  metFORMIN ER (GLUCOPHAGE-XR) 500 MG 24 hr tablet, Take 1 tablet by mouth Daily With Breakfast., Disp: 90 tablet, Rfl: 1  •  omega-3 acid ethyl esters (LOVAZA) 1 g capsule, Take 2 capsules by mouth 2 (Two) Times a Day., Disp: 360 capsule, Rfl: 3  •  Pitavastatin Calcium (LIVALO) 4 MG tablet, Take 1 tablet by mouth Daily., Disp: 90 tablet, Rfl: 3    Procedures    Lab Results (most recent)      None              Tatiana was seen today for hypertension, hypothyroidism and obesity.    Diagnoses and all orders for this visit:    Other specified hypothyroidism  -     TSH  -     T4, free    Essential hypertension    Class 3 obesity due to excess calories with serious comorbidity and body mass index (BMI) of 40.0 to 44.9 in adult (CMS/[facility])    Repeat thyroid labs as above in 4 weeks.  Blood pressure improving, no change to medicines.  She will continue checking her blood pressure at home.  Class III obesity worsening.  She has gained weight despite plan to decrease carbs and increase exercise.  She will consider getting a gym membership, we discussed the  someone to help her with weight lifting.  She does enjoy walking.  She also has ridden a bike in the past with improvement      Return in about 3 months (around 10/13/2018), or if symptoms worsen or fail to improve, for Annual physical (after September) .      Dawna Abarca MD

## 2018-07-25 RX ORDER — OMEGA-3-ACID ETHYL ESTERS 1 G/1
CAPSULE, LIQUID FILLED ORAL
Qty: 360 CAPSULE | Refills: 3 | Status: SHIPPED | OUTPATIENT
Start: 2018-07-25 | End: 2019-09-09 | Stop reason: SDUPTHER

## 2018-07-25 RX ORDER — BUDESONIDE AND FORMOTEROL FUMARATE DIHYDRATE 80; 4.5 UG/1; UG/1
AEROSOL RESPIRATORY (INHALATION)
Qty: 3 INHALER | Refills: 3 | Status: SHIPPED | OUTPATIENT
Start: 2018-07-25 | End: 2019-09-25 | Stop reason: SDUPTHER

## 2018-07-25 RX ORDER — METFORMIN HYDROCHLORIDE 500 MG/1
500 TABLET, EXTENDED RELEASE ORAL
Qty: 90 TABLET | Refills: 1 | Status: SHIPPED | OUTPATIENT
Start: 2018-07-25 | End: 2019-01-14 | Stop reason: SDUPTHER

## 2018-07-25 RX ORDER — LISINOPRIL AND HYDROCHLOROTHIAZIDE 12.5; 1 MG/1; MG/1
1 TABLET ORAL DAILY
Qty: 90 TABLET | Refills: 3 | Status: SHIPPED | OUTPATIENT
Start: 2018-07-25 | End: 2019-04-01 | Stop reason: SDUPTHER

## 2018-08-01 ENCOUNTER — TELEPHONE (OUTPATIENT)
Dept: FAMILY MEDICINE CLINIC | Facility: CLINIC | Age: 58
End: 2018-08-01

## 2018-08-01 NOTE — TELEPHONE ENCOUNTER
[facility] spoke with pt stating that they are unable to fill the Pitavastatin Calcium 4 mg tablet Rx. They need a new prescription sent over by Dr. Abarca. They only physician they have to authorize this by is NP, Katt Salinas.

## 2018-08-22 LAB
T4 FREE SERPL-MCNC: 1.57 NG/DL (ref 0.82–1.77)
TSH SERPL DL<=0.005 MIU/L-ACNC: 3.45 UIU/ML (ref 0.45–4.5)

## 2018-08-27 RX ORDER — LEVOTHYROXINE SODIUM 0.1 MG/1
100 TABLET ORAL DAILY
Qty: 90 TABLET | Refills: 3 | Status: SHIPPED | OUTPATIENT
Start: 2018-08-27 | End: 2019-01-02 | Stop reason: SDUPTHER

## 2018-10-01 ENCOUNTER — TRANSCRIBE ORDERS (OUTPATIENT)
Dept: ADMINISTRATIVE | Facility: HOSPITAL | Age: 58
End: 2018-10-01

## 2018-10-01 DIAGNOSIS — E04.1 THYROID NODULE: Primary | ICD-10-CM

## 2018-10-05 ENCOUNTER — HOSPITAL ENCOUNTER (OUTPATIENT)
Dept: ULTRASOUND IMAGING | Facility: HOSPITAL | Age: 58
Discharge: HOME | End: 2018-10-05
Attending: OTOLARYNGOLOGY | Admitting: OTOLARYNGOLOGY

## 2018-10-05 DIAGNOSIS — E04.1 THYROID NODULE: ICD-10-CM

## 2018-10-05 PROCEDURE — 76536 US EXAM OF HEAD AND NECK: CPT

## 2018-11-27 ENCOUNTER — APPOINTMENT (OUTPATIENT)
Dept: WOMENS IMAGING | Facility: HOSPITAL | Age: 58
End: 2018-11-27

## 2018-11-27 PROCEDURE — 77067 SCR MAMMO BI INCL CAD: CPT | Performed by: RADIOLOGY

## 2018-11-27 PROCEDURE — 77063 BREAST TOMOSYNTHESIS BI: CPT | Performed by: RADIOLOGY

## 2019-01-02 RX ORDER — LEVOTHYROXINE SODIUM 0.1 MG/1
100 TABLET ORAL DAILY
Qty: 90 TABLET | Refills: 3 | Status: SHIPPED | OUTPATIENT
Start: 2019-01-02 | End: 2019-04-01 | Stop reason: SDUPTHER

## 2019-01-15 RX ORDER — METFORMIN HYDROCHLORIDE 500 MG/1
TABLET, EXTENDED RELEASE ORAL
Qty: 90 TABLET | Refills: 1 | Status: SHIPPED | OUTPATIENT
Start: 2019-01-15 | End: 2019-04-01 | Stop reason: SDUPTHER

## 2019-03-03 NOTE — PATIENT INSTRUCTIONS
Please review the decision aid used during our discussion regarding the Low dose lung cancer screening visit today.                          
Home

## 2019-04-01 RX ORDER — LISINOPRIL AND HYDROCHLOROTHIAZIDE 12.5; 1 MG/1; MG/1
1 TABLET ORAL DAILY
Qty: 90 TABLET | Refills: 3 | Status: SHIPPED | OUTPATIENT
Start: 2019-04-01 | End: 2020-03-23 | Stop reason: SDUPTHER

## 2019-04-01 RX ORDER — METFORMIN HYDROCHLORIDE 500 MG/1
500 TABLET, EXTENDED RELEASE ORAL
Qty: 90 TABLET | Refills: 1 | Status: SHIPPED | OUTPATIENT
Start: 2019-04-01 | End: 2019-11-05 | Stop reason: SDUPTHER

## 2019-04-01 RX ORDER — LEVOTHYROXINE SODIUM 0.1 MG/1
100 TABLET ORAL DAILY
Qty: 90 TABLET | Refills: 3 | Status: SHIPPED | OUTPATIENT
Start: 2019-04-01 | End: 2020-07-15

## 2019-09-09 RX ORDER — OMEGA-3-ACID ETHYL ESTERS 1 G/1
CAPSULE, LIQUID FILLED ORAL
Qty: 360 CAPSULE | Refills: 4 | Status: SHIPPED | OUTPATIENT
Start: 2019-09-09 | End: 2020-06-16 | Stop reason: SDUPTHER

## 2019-09-23 RX ORDER — BUDESONIDE AND FORMOTEROL FUMARATE DIHYDRATE 80; 4.5 UG/1; UG/1
AEROSOL RESPIRATORY (INHALATION)
Qty: 30.6 G | Refills: 4 | OUTPATIENT
Start: 2019-09-23

## 2019-09-25 RX ORDER — BUDESONIDE AND FORMOTEROL FUMARATE DIHYDRATE 80; 4.5 UG/1; UG/1
AEROSOL RESPIRATORY (INHALATION)
Qty: 3 INHALER | Refills: 3 | Status: SHIPPED | OUTPATIENT
Start: 2019-09-25 | End: 2020-06-16 | Stop reason: SDUPTHER

## 2019-11-01 ENCOUNTER — OFFICE VISIT (OUTPATIENT)
Dept: FAMILY MEDICINE CLINIC | Facility: CLINIC | Age: 59
End: 2019-11-01

## 2019-11-01 VITALS
HEART RATE: 75 BPM | SYSTOLIC BLOOD PRESSURE: 138 MMHG | BODY MASS INDEX: 42.51 KG/M2 | DIASTOLIC BLOOD PRESSURE: 86 MMHG | HEIGHT: 62 IN | TEMPERATURE: 98.6 F | WEIGHT: 231 LBS | OXYGEN SATURATION: 98 %

## 2019-11-01 DIAGNOSIS — Z23 NEED FOR IMMUNIZATION AGAINST INFLUENZA: ICD-10-CM

## 2019-11-01 DIAGNOSIS — Z87.891 HISTORY OF SMOKING 30 OR MORE PACK YEARS: ICD-10-CM

## 2019-11-01 DIAGNOSIS — Z00.00 HEALTH MAINTENANCE EXAMINATION: Primary | ICD-10-CM

## 2019-11-01 DIAGNOSIS — I10 ESSENTIAL HYPERTENSION: ICD-10-CM

## 2019-11-01 DIAGNOSIS — J44.9 CHRONIC OBSTRUCTIVE PULMONARY DISEASE, UNSPECIFIED COPD TYPE (HCC): ICD-10-CM

## 2019-11-01 DIAGNOSIS — E03.9 ACQUIRED HYPOTHYROIDISM: ICD-10-CM

## 2019-11-01 DIAGNOSIS — Z13.220 SCREENING FOR LIPID DISORDERS: ICD-10-CM

## 2019-11-01 DIAGNOSIS — E11.9 DIABETES MELLITUS WITHOUT COMPLICATION (HCC): ICD-10-CM

## 2019-11-01 DIAGNOSIS — E55.9 HYPOVITAMINOSIS D: ICD-10-CM

## 2019-11-01 DIAGNOSIS — E78.5 HYPERLIPIDEMIA, UNSPECIFIED HYPERLIPIDEMIA TYPE: ICD-10-CM

## 2019-11-01 DIAGNOSIS — Z12.2 ENCOUNTER FOR SCREENING FOR LUNG CANCER: ICD-10-CM

## 2019-11-01 PROCEDURE — 99214 OFFICE O/P EST MOD 30 MIN: CPT | Performed by: FAMILY MEDICINE

## 2019-11-01 PROCEDURE — 90471 IMMUNIZATION ADMIN: CPT | Performed by: FAMILY MEDICINE

## 2019-11-01 PROCEDURE — 90674 CCIIV4 VAC NO PRSV 0.5 ML IM: CPT | Performed by: FAMILY MEDICINE

## 2019-11-01 PROCEDURE — 99396 PREV VISIT EST AGE 40-64: CPT | Performed by: FAMILY MEDICINE

## 2019-11-01 RX ORDER — LISINOPRIL AND HYDROCHLOROTHIAZIDE 12.5; 1 MG/1; MG/1
1 TABLET ORAL DAILY
Qty: 90 TABLET | Refills: 3 | Status: CANCELLED | OUTPATIENT
Start: 2019-11-01

## 2019-11-01 RX ORDER — METFORMIN HYDROCHLORIDE 500 MG/1
500 TABLET, EXTENDED RELEASE ORAL
Qty: 90 TABLET | Refills: 1 | Status: CANCELLED | OUTPATIENT
Start: 2019-11-01

## 2019-11-01 RX ORDER — LEVOTHYROXINE SODIUM 0.1 MG/1
100 TABLET ORAL DAILY
Qty: 90 TABLET | Refills: 3 | Status: CANCELLED | OUTPATIENT
Start: 2019-11-01

## 2019-11-01 NOTE — PROGRESS NOTES
Tatiana Turner is a 59 y.o. female.     Chief Complaint   Patient presents with   • Annual Exam     no complains        HPI     Pt is a pleasant 59 y.o. YO female here for Annual Exam, hypothyroidism well controlled, diabetes well controlled, HTN well controlled, HLD well controlled.  Last seen over 1 year ago.       Health Maintenance   Topic Date Due   • PNEUMOCOCCAL VACCINE (19-64 MEDIUM RISK) (1 of 1 - PPSV23) 10/25/1981   • TDAP/TD VACCINES (1 - Tdap) 10/25/1981   • ZOSTER VACCINE (1 of 2) 10/25/2012   • HEPATITIS C SCREENING  01/29/2016   • DIABETIC FOOT EXAM  01/29/2016   • URINE MICROALBUMIN  09/13/2017   • LUNG CANCER SCREENING  10/25/2017   • LIPID PANEL  09/11/2018   • HEMOGLOBIN A1C  12/14/2018   • DIABETIC EYE EXAM  07/12/2019   • INFLUENZA VACCINE  08/01/2019   • ANNUAL PHYSICAL  11/02/2020   • MAMMOGRAM  12/01/2020   • PAP SMEAR  12/01/2021   • COLONOSCOPY  01/01/2024       Diet: Diet not healthy   Exercise: Exercising 3 times a week.   GYN: UTD   Immunizations: Due for flu      The following portions of the patient's history were reviewed and updated as appropriate: allergies, current medications, past family history, past medical history, past social history, past surgical history and problem list.    Review of Systems   Eyes: Negative.    Respiratory: Negative.    Cardiovascular: Negative for chest pain and leg swelling.   Gastrointestinal: Negative.    Endocrine: Negative.    Genitourinary: Negative.    Musculoskeletal: Negative.    Allergic/Immunologic: Negative.    Neurological: Negative.    Hematological: Negative.    Psychiatric/Behavioral: Negative.        Objective  Vitals:    11/01/19 0924   BP: 138/86   Pulse: 75   Temp: 98.6 °F (37 °C)   SpO2: 98%        Physical Exam   Constitutional: She is oriented to person, place, and time. She appears well-developed and well-nourished. No distress.   HENT:   Head: Normocephalic.   Nose: Nose normal.   Eyes: EOM are normal.   Cardiovascular:  Normal rate, regular rhythm, normal heart sounds and intact distal pulses.   No murmur heard.  Pulmonary/Chest: Effort normal and breath sounds normal. No respiratory distress.   Musculoskeletal: Normal range of motion.   Neurological: She is alert and oriented to person, place, and time.   Skin: Skin is warm and dry. No rash noted.   Psychiatric: She has a normal mood and affect. Her behavior is normal. Judgment and thought content normal.   Nursing note and vitals reviewed.        Current Outpatient Medications:   •  aspirin 81 MG tablet, Take 1 tablet by mouth daily., Disp: , Rfl:   •  budesonide-formoterol (SYMBICORT) 80-4.5 MCG/ACT inhaler, Symbicort 80-4.5 MCG/ACT Inhalation Aerosol inhale 2 puffs twice daily; 3; 3; 01-Apr-2014; Active, Disp: 3 inhaler, Rfl: 3  •  calcium citrate-vitamin D (CITRACAL+D) 315-200 MG-UNIT per tablet, Take 1 tablet by mouth daily., Disp: , Rfl:   •  Cholecalciferol (VITAMIN D) 1000 UNITS capsule, Take by mouth. Take as directed, Disp: , Rfl:   •  estradiol (VAGIFEM) 10 MCG tablet vaginal tablet, Insert into the vagina., Disp: , Rfl:   •  glucose blood test strip, daily. Use 1 strip daily, Disp: , Rfl:   •  Lancets (FREESTYLE) lancets, Use as directed, Disp: , Rfl:   •  levothyroxine (SYNTHROID) 100 MCG tablet, Take 1 tablet by mouth Daily., Disp: 90 tablet, Rfl: 3  •  lisinopril-hydrochlorothiazide (PRINZIDE,ZESTORETIC) 10-12.5 MG per tablet, Take 1 tablet by mouth Daily., Disp: 90 tablet, Rfl: 3  •  metFORMIN ER (GLUCOPHAGE-XR) 500 MG 24 hr tablet, Take 1 tablet by mouth Daily With Breakfast., Disp: 90 tablet, Rfl: 1  •  omega-3 acid ethyl esters (LOVAZA) 1 g capsule, TAKE 2 CAPSULES TWICE A DAY, Disp: 360 capsule, Rfl: 4  •  Pitavastatin Calcium (LIVALO) 4 MG tablet, Take 1 tablet by mouth Daily., Disp: 90 tablet, Rfl: 3    Procedures    Lab Results (most recent)     None              Tatiana was seen today for annual exam.    Diagnoses and all orders for this visit:    Health  maintenance examination    Screening for lipid disorders    Acquired hypothyroidism  -     TSH  -     T4, free    Diabetes mellitus without complication (CMS/HCC)  -     Comprehensive Metabolic Panel  -     Lipid Panel  -     Hemoglobin A1c    Hypovitaminosis D  -     Vitamin D 25 Hydroxy    History of smoking 30 or more pack years  -     CT chest low dose wo; Future    Encounter for screening for lung cancer  -     CT chest low dose wo; Future    Hyperlipidemia, unspecified hyperlipidemia type  -     Lipid Panel    Essential hypertension  -     Comprehensive Metabolic Panel    Chronic obstructive pulmonary disease, unspecified COPD type (CMS/HCC)    Need for immunization against influenza  -     Flucelvax Quad=>4Years (PFS)    Other orders  -     Cancel: metFORMIN ER (GLUCOPHAGE-XR) 500 MG 24 hr tablet; Take 1 tablet by mouth Daily With Breakfast.  -     Cancel: lisinopril-hydrochlorothiazide (PRINZIDE,ZESTORETIC) 10-12.5 MG per tablet; Take 1 tablet by mouth Daily.  -     Cancel: levothyroxine (SYNTHROID) 100 MCG tablet; Take 1 tablet by mouth Daily.  -     Cancel: Pitavastatin Calcium (LIVALO) 4 MG tablet; Take 1 tablet by mouth Daily.      Patient here for annual exam.  She was last seen August 2018.  We discussed importance of being seen at the minimum of every 6 months as she is being treated for diabetes, hypertension and hyperlipidemia.  All of these conditions are well controlled previously, labs as above.  Will wait to send medications pending the labs in case dose adjustments as necessary.  Due for flu shot, given today.  Follows up with GYN with Dr. Romero, will get Pap smear mammogram as indicated.  Colonoscopy up-to-date.  She does have a 30-year smoking history and quit in 2010, CT scan for lung cancer screening ordered.  Otherwise remaining health maintenance is up-to-date.      Follow-up in 6 months for diabetes and hypertension unless hemoglobin A1c is greater than 7 and dose adjustment needed,  then follow-up in 3 months.    Return in about 6 months (around 5/1/2020), or if symptoms worsen or fail to improve, for Recheck Diabetes and HTN  .      Dawna Abarca MD

## 2019-11-02 LAB
25(OH)D3+25(OH)D2 SERPL-MCNC: 38 NG/ML (ref 30–100)
ALBUMIN SERPL-MCNC: 4.3 G/DL (ref 3.5–5.2)
ALBUMIN/GLOB SERPL: 1.6 G/DL
ALP SERPL-CCNC: 73 U/L (ref 39–117)
ALT SERPL-CCNC: 23 U/L (ref 1–33)
AST SERPL-CCNC: 17 U/L (ref 1–32)
BILIRUB SERPL-MCNC: 0.4 MG/DL (ref 0.2–1.2)
BUN SERPL-MCNC: 16 MG/DL (ref 6–20)
BUN/CREAT SERPL: 25.8 (ref 7–25)
CALCIUM SERPL-MCNC: 9.4 MG/DL (ref 8.6–10.5)
CHLORIDE SERPL-SCNC: 99 MMOL/L (ref 98–107)
CHOLEST SERPL-MCNC: 212 MG/DL (ref 0–200)
CO2 SERPL-SCNC: 30.8 MMOL/L (ref 22–29)
CREAT SERPL-MCNC: 0.62 MG/DL (ref 0.57–1)
GLOBULIN SER CALC-MCNC: 2.7 GM/DL
GLUCOSE SERPL-MCNC: 132 MG/DL (ref 65–99)
HBA1C MFR BLD: 6.75 % (ref 4.8–5.6)
HDLC SERPL-MCNC: 61 MG/DL (ref 40–60)
LDLC SERPL CALC-MCNC: 109 MG/DL (ref 0–100)
POTASSIUM SERPL-SCNC: 3.9 MMOL/L (ref 3.5–5.2)
PROT SERPL-MCNC: 7 G/DL (ref 6–8.5)
SODIUM SERPL-SCNC: 139 MMOL/L (ref 136–145)
T4 FREE SERPL-MCNC: 1.53 NG/DL (ref 0.93–1.7)
TRIGL SERPL-MCNC: 209 MG/DL (ref 0–150)
TSH SERPL DL<=0.005 MIU/L-ACNC: 2.04 UIU/ML (ref 0.27–4.2)
VLDLC SERPL CALC-MCNC: 41.8 MG/DL (ref 5–40)

## 2019-11-05 RX ORDER — METFORMIN HYDROCHLORIDE 500 MG/1
500 TABLET, EXTENDED RELEASE ORAL
Qty: 90 TABLET | Refills: 1 | Status: SHIPPED | OUTPATIENT
Start: 2019-11-05 | End: 2020-06-16 | Stop reason: SDUPTHER

## 2019-11-07 NOTE — PROGRESS NOTES
Please call patient back with results.  The labs has resulted as overall worse than previous.  I would recommend to make an appointment to discuss the lab results.  Your cholesterol is significantly higher, screen for diabetes is positive.  Hemoglobin A1c worsened from 6.3-6.75.  (The diagnosis of diabetes is at 6.5.)  Please FU with me at your convenience.     Thank you

## 2019-11-11 ENCOUNTER — OFFICE VISIT (OUTPATIENT)
Dept: FAMILY MEDICINE CLINIC | Facility: CLINIC | Age: 59
End: 2019-11-11

## 2019-11-11 VITALS
HEIGHT: 62 IN | OXYGEN SATURATION: 98 % | DIASTOLIC BLOOD PRESSURE: 82 MMHG | TEMPERATURE: 98.1 F | WEIGHT: 233 LBS | HEART RATE: 89 BPM | SYSTOLIC BLOOD PRESSURE: 126 MMHG | BODY MASS INDEX: 42.88 KG/M2

## 2019-11-11 DIAGNOSIS — E11.9 DIABETES MELLITUS WITHOUT COMPLICATION (HCC): Primary | ICD-10-CM

## 2019-11-11 DIAGNOSIS — E78.5 HYPERLIPIDEMIA, UNSPECIFIED HYPERLIPIDEMIA TYPE: ICD-10-CM

## 2019-11-11 DIAGNOSIS — I10 ESSENTIAL HYPERTENSION: ICD-10-CM

## 2019-11-11 PROCEDURE — 99214 OFFICE O/P EST MOD 30 MIN: CPT | Performed by: FAMILY MEDICINE

## 2019-11-11 NOTE — PROGRESS NOTES
Tatiana Turner is a 59 y.o. female.     Chief Complaint   Patient presents with   • Hyperlipidemia     follow up aftrer blood test   • Prediabetes     follow up after blood test       HPI     Pt is a pleasant 59 y.o. YO female here for Hyperlipidemia not well controlled, Diabetes well controlled but worsening, hypertension well-controlled.    Patient has had worsening nutrition over the last few months.  We discontinued fenofibrate earlier last year but do think that the elevated blood sugar and cholesterol is related to dietary changes.  She has been exercising 3 times a week.  Feeling well overall, she does not want to try another statin as she is failed atorvastatin and rosuvastatin with myalgia.  She is currently on pravastatin 4 mg without adverse effect.  She [facility] [employer] last week.      The following portions of the patient's history were reviewed and updated as appropriate: allergies, current medications, past family history, past medical history, past social history, past surgical history and problem list.    Review of Systems   Constitutional: Negative.    HENT: Negative.    Eyes: Negative.    Respiratory: Negative.    Cardiovascular: Negative.    Gastrointestinal: Negative.    Endocrine: Negative for cold intolerance, heat intolerance, polydipsia, polyphagia and polyuria.   Musculoskeletal: Negative.    Skin: Negative.    Allergic/Immunologic: Negative.    Neurological: Negative.    Hematological: Negative.    Psychiatric/Behavioral: Negative.        Objective  Vitals:    11/11/19 0908   BP: 126/82   Pulse: 89   Temp: 98.1 °F (36.7 °C)   SpO2: 98%        Physical Exam   Constitutional: She is oriented to person, place, and time. She appears well-developed and well-nourished. No distress.   HENT:   Head: Normocephalic.   Nose: Nose normal.   Eyes: EOM are normal.   Cardiovascular: Normal rate, regular rhythm, normal heart sounds and intact distal pulses.   No murmur  heard.  Pulmonary/Chest: Effort normal and breath sounds normal. No respiratory distress.   Musculoskeletal: Normal range of motion.   Neurological: She is alert and oriented to person, place, and time.   Skin: Skin is warm and dry. No rash noted.   Psychiatric: She has a normal mood and affect. Her behavior is normal. Judgment and thought content normal.   Nursing note and vitals reviewed.        Current Outpatient Medications:   •  aspirin 81 MG tablet, Take 1 tablet by mouth daily., Disp: , Rfl:   •  budesonide-formoterol (SYMBICORT) 80-4.5 MCG/ACT inhaler, Symbicort 80-4.5 MCG/ACT Inhalation Aerosol inhale 2 puffs twice daily; 3; 3; 01-Apr-2014; Active, Disp: 3 inhaler, Rfl: 3  •  calcium citrate-vitamin D (CITRACAL+D) 315-200 MG-UNIT per tablet, Take 1 tablet by mouth daily., Disp: , Rfl:   •  Cholecalciferol (VITAMIN D) 1000 UNITS capsule, Take by mouth. Take as directed, Disp: , Rfl:   •  estradiol (VAGIFEM) 10 MCG tablet vaginal tablet, Insert into the vagina., Disp: , Rfl:   •  glucose blood test strip, daily. Use 1 strip daily, Disp: , Rfl:   •  Lancets (FREESTYLE) lancets, Use as directed, Disp: , Rfl:   •  levothyroxine (SYNTHROID) 100 MCG tablet, Take 1 tablet by mouth Daily., Disp: 90 tablet, Rfl: 3  •  lisinopril-hydrochlorothiazide (PRINZIDE,ZESTORETIC) 10-12.5 MG per tablet, Take 1 tablet by mouth Daily., Disp: 90 tablet, Rfl: 3  •  metFORMIN ER (GLUCOPHAGE-XR) 500 MG 24 hr tablet, Take 1 tablet by mouth Daily With Breakfast., Disp: 90 tablet, Rfl: 1  •  omega-3 acid ethyl esters (LOVAZA) 1 g capsule, TAKE 2 CAPSULES TWICE A DAY, Disp: 360 capsule, Rfl: 4  •  Pitavastatin Calcium (LIVALO) 4 MG tablet, Take 1 tablet by mouth Daily., Disp: 90 tablet, Rfl: 3    Procedures    Lab Results (most recent)     None              Tatiana was seen today for hyperlipidemia and prediabetes.    Diagnoses and all orders for this visit:    Diabetes mellitus without complication (CMS/[facility])  -     Hemoglobin  A1c    Hyperlipidemia, unspecified hyperlipidemia type  -     Lipid Panel    Essential hypertension  -     Basic Metabolic Panel      Type 2 diabetes worsening but well controlled on metformin 500, we discussed increasing to 1000 but patient would like to do aggressive lifestyle changes first.  She joined [employer] last week.    Patient with hyperlipidemia.  Discussed the pravastatin is likely not giving her great benefit especially at 4 mg.  I do think she should try another statin potentially with coenzyme Q 10.  I recommended simvastatin as she has failed rosuvastatin and atorvastatin.  Will do aggressive lifestyle changes first before changing medication.    Hypertension well controlled.  Tolerating medications without adverse effect.  Renal function normal.    Return in about 3 months (around 2/11/2020), or if symptoms worsen or fail to improve, for Recheck DM and HLD with FBW prior .      Dawna Abarca MD

## 2019-11-19 ENCOUNTER — HOSPITAL ENCOUNTER (OUTPATIENT)
Dept: CT IMAGING | Facility: HOSPITAL | Age: 59
Discharge: HOME | End: 2019-11-19
Admitting: FAMILY MEDICINE

## 2019-11-19 DIAGNOSIS — Z12.2 ENCOUNTER FOR SCREENING FOR LUNG CANCER: ICD-10-CM

## 2019-11-19 DIAGNOSIS — Z87.891 HISTORY OF SMOKING 30 OR MORE PACK YEARS: ICD-10-CM

## 2019-11-19 PROCEDURE — G0297 LDCT FOR LUNG CA SCREEN: HCPCS

## 2019-11-28 NOTE — PROGRESS NOTES
Please call patient back with results.  The CT Chest has resulted as Negative for lung cancer.  We can repeat this next year.  Thank you

## 2019-12-03 ENCOUNTER — APPOINTMENT (OUTPATIENT)
Dept: WOMENS IMAGING | Facility: HOSPITAL | Age: 59
End: 2019-12-03

## 2019-12-03 PROCEDURE — 77067 SCR MAMMO BI INCL CAD: CPT | Performed by: RADIOLOGY

## 2019-12-03 PROCEDURE — 77063 BREAST TOMOSYNTHESIS BI: CPT | Performed by: RADIOLOGY

## 2020-02-04 ENCOUNTER — LAB (OUTPATIENT)
Dept: FAMILY MEDICINE CLINIC | Facility: CLINIC | Age: 60
End: 2020-02-04

## 2020-02-04 DIAGNOSIS — E03.9 ACQUIRED HYPOTHYROIDISM: Primary | ICD-10-CM

## 2020-02-04 LAB
BUN SERPL-MCNC: 16 MG/DL (ref 6–20)
BUN/CREAT SERPL: 20.3 (ref 7–25)
CALCIUM SERPL-MCNC: 9.4 MG/DL (ref 8.6–10.5)
CHLORIDE SERPL-SCNC: 99 MMOL/L (ref 98–107)
CHOLEST SERPL-MCNC: 201 MG/DL (ref 0–200)
CO2 SERPL-SCNC: 24.8 MMOL/L (ref 22–29)
CREAT SERPL-MCNC: 0.79 MG/DL (ref 0.57–1)
GLUCOSE SERPL-MCNC: 139 MG/DL (ref 65–99)
HBA1C MFR BLD: 6.8 % (ref 4.8–5.6)
HDLC SERPL-MCNC: 57 MG/DL (ref 40–60)
LDLC SERPL CALC-MCNC: 108 MG/DL (ref 0–100)
POTASSIUM SERPL-SCNC: 4.6 MMOL/L (ref 3.5–5.2)
SODIUM SERPL-SCNC: 137 MMOL/L (ref 136–145)
TRIGL SERPL-MCNC: 180 MG/DL (ref 0–150)
VLDLC SERPL CALC-MCNC: 36 MG/DL

## 2020-02-05 LAB
FT4I SERPL CALC-MCNC: 2.3 (ref 1.2–4.9)
T3RU NFR SERPL: 31 % (ref 24–39)
T4 SERPL-MCNC: 7.5 UG/DL (ref 4.5–12)
TSH SERPL DL<=0.005 MIU/L-ACNC: 3.65 UIU/ML (ref 0.45–4.5)

## 2020-02-11 ENCOUNTER — OFFICE VISIT (OUTPATIENT)
Dept: FAMILY MEDICINE CLINIC | Facility: CLINIC | Age: 60
End: 2020-02-11

## 2020-02-11 ENCOUNTER — TELEPHONE (OUTPATIENT)
Dept: FAMILY MEDICINE CLINIC | Facility: CLINIC | Age: 60
End: 2020-02-11

## 2020-02-11 VITALS
DIASTOLIC BLOOD PRESSURE: 82 MMHG | BODY MASS INDEX: 43.06 KG/M2 | WEIGHT: 234 LBS | SYSTOLIC BLOOD PRESSURE: 134 MMHG | TEMPERATURE: 98.4 F | HEART RATE: 76 BPM | HEIGHT: 62 IN | OXYGEN SATURATION: 99 %

## 2020-02-11 DIAGNOSIS — E66.01 CLASS 3 SEVERE OBESITY DUE TO EXCESS CALORIES WITH SERIOUS COMORBIDITY AND BODY MASS INDEX (BMI) OF 40.0 TO 44.9 IN ADULT (HCC): ICD-10-CM

## 2020-02-11 DIAGNOSIS — E11.9 DIABETES MELLITUS WITHOUT COMPLICATION (HCC): Primary | ICD-10-CM

## 2020-02-11 DIAGNOSIS — E78.2 MIXED HYPERLIPIDEMIA: ICD-10-CM

## 2020-02-11 PROBLEM — E66.813 CLASS 3 SEVERE OBESITY DUE TO EXCESS CALORIES WITH SERIOUS COMORBIDITY AND BODY MASS INDEX (BMI) OF 40.0 TO 44.9 IN ADULT: Status: ACTIVE | Noted: 2017-05-05

## 2020-02-11 PROCEDURE — 99214 OFFICE O/P EST MOD 30 MIN: CPT | Performed by: FAMILY MEDICINE

## 2020-02-11 NOTE — PROGRESS NOTES
Tatiana Turner is a 59 y.o. female.     Chief Complaint   Patient presents with   • Diabetes     follow up from blood test   • Hyperlipidemia     follow up from blood work        HPI     Pt is a pleasant 59 y.o. YO female here for Diabetes well controlled, HLD improving and well controlled statin intolerant to other meds, hypothyroidism well controlled.  Not motivated to loose weight. BMI 42 gained 13 pounds since her last appointment.    The following portions of the patient's history were reviewed and updated as appropriate: allergies, current medications, past family history, past medical history, past social history, past surgical history and problem list.    Review of Systems   Constitutional: Negative.    HENT: Negative.    Eyes: Negative.    Respiratory: Negative.    Cardiovascular: Negative for chest pain, palpitations and leg swelling.   Gastrointestinal: Negative.    Endocrine: Negative.    Genitourinary: Negative.    Musculoskeletal: Negative.    Skin: Negative.    Allergic/Immunologic: Negative.    Neurological: Negative.    Hematological: Negative.    Psychiatric/Behavioral: Negative.        Objective  Vitals:    02/11/20 1003   BP: 134/82   Pulse: 76   Temp: 98.4 °F (36.9 °C)   SpO2: 99%   Body mass index is 42.8 kg/m².       Physical Exam   Constitutional: She is oriented to person, place, and time. She appears well-developed and well-nourished. No distress.   HENT:   Head: Normocephalic.   Nose: Nose normal.   Eyes: EOM are normal.   Cardiovascular: Normal rate, regular rhythm, normal heart sounds and intact distal pulses.   No murmur heard.  Pulmonary/Chest: Effort normal and breath sounds normal. No respiratory distress.   Musculoskeletal: Normal range of motion.   Neurological: She is alert and oriented to person, place, and time.   Skin: Skin is warm and dry. No rash noted.   Psychiatric: She has a normal mood and affect. Her behavior is normal. Judgment and thought content normal.    Nursing note and vitals reviewed.        Current Outpatient Medications:   •  aspirin 81 MG tablet, Take 1 tablet by mouth daily., Disp: , Rfl:   •  budesonide-formoterol (SYMBICORT) 80-4.5 MCG/ACT inhaler, Symbicort 80-4.5 MCG/ACT Inhalation Aerosol inhale 2 puffs twice daily; 3; 3; 01-Apr-2014; Active, Disp: 3 inhaler, Rfl: 3  •  calcium citrate-vitamin D (CITRACAL+D) 315-200 MG-UNIT per tablet, Take 1 tablet by mouth daily., Disp: , Rfl:   •  Cholecalciferol (VITAMIN D) 1000 UNITS capsule, Take by mouth. Take as directed, Disp: , Rfl:   •  estradiol (VAGIFEM) 10 MCG tablet vaginal tablet, Insert into the vagina., Disp: , Rfl:   •  glucose blood test strip, daily. Use 1 strip daily, Disp: , Rfl:   •  levothyroxine (SYNTHROID) 100 MCG tablet, Take 1 tablet by mouth Daily., Disp: 90 tablet, Rfl: 3  •  lisinopril-hydrochlorothiazide (PRINZIDE,ZESTORETIC) 10-12.5 MG per tablet, Take 1 tablet by mouth Daily., Disp: 90 tablet, Rfl: 3  •  metFORMIN ER (GLUCOPHAGE-XR) 500 MG 24 hr tablet, Take 1 tablet by mouth Daily With Breakfast., Disp: 90 tablet, Rfl: 1  •  omega-3 acid ethyl esters (LOVAZA) 1 g capsule, TAKE 2 CAPSULES TWICE A DAY, Disp: 360 capsule, Rfl: 4  •  Pitavastatin Calcium (LIVALO) 4 MG tablet, Take 1 tablet by mouth Daily., Disp: 90 tablet, Rfl: 3  •  Lancets (FREESTYLE) lancets, Use as directed, Disp: , Rfl:     Procedures    Lab Results (most recent)     None              Tatiana was seen today for diabetes and hyperlipidemia.    Diagnoses and all orders for this visit:    Diabetes mellitus without complication (CMS/[facility])    Mixed hyperlipidemia    Class 3 severe obesity due to excess calories with serious comorbidity and body mass index (BMI) of 40.0 to 44.9 in adult (CMS/[facility])    Diabetes well controlled, continue metformin,    Hyperlipidemia well-controlled, continue with pravastatin, unable to tolerate other statins.  Continue with aspirin 81 as we cannot use a higher statin.    Patient with obesity  that is worsening, gained 13 pounds since 3 months ago.  We discussed diet and lifestyle changes.  The patient is not motivated to change her lifestyle.  We had a very realistic conversation about her risk for heart attack and stroke especially with continuing to gain weight with a BMI of 42.  She is aware that this is related to eating and nutrition.  Knows that she needs to change what she does but does not have the motivation to actually do something at this time.  I do not think she would be a good candidate for bariatric surgery as I think she will regain any weight she loses until she is motivated and invested in weight loss.      Return in about 3 months (around 5/11/2020), or if symptoms worsen or fail to improve, for Recheck Diabetes.      Dawna Abarca MD

## 2020-03-23 RX ORDER — LISINOPRIL AND HYDROCHLOROTHIAZIDE 12.5; 1 MG/1; MG/1
1 TABLET ORAL DAILY
Qty: 90 TABLET | Refills: 3 | Status: SHIPPED | OUTPATIENT
Start: 2020-03-23 | End: 2021-04-29

## 2020-05-07 ENCOUNTER — TELEPHONE (OUTPATIENT)
Dept: FAMILY MEDICINE CLINIC | Facility: CLINIC | Age: 60
End: 2020-05-07

## 2020-05-07 DIAGNOSIS — E78.2 MIXED HYPERLIPIDEMIA: ICD-10-CM

## 2020-05-07 DIAGNOSIS — E11.9 DIABETES MELLITUS WITHOUT COMPLICATION (HCC): ICD-10-CM

## 2020-05-07 DIAGNOSIS — I10 ESSENTIAL HYPERTENSION: Primary | ICD-10-CM

## 2020-05-07 NOTE — TELEPHONE ENCOUNTER
You do so far only have one pt coming in Tuesday PM, I could r/s to afternoon for in office if you would like

## 2020-05-07 NOTE — TELEPHONE ENCOUNTER
Called pt to convert her visit to telehealth on Tue. She said she needs to see you and get labs at this visit. We do not have lab appts right now for a couple of weeks. Do you want pt to r/s out or have telehealth visit and you order labs for her to come another day or elsewhere? Please advise.

## 2020-05-07 NOTE — TELEPHONE ENCOUNTER
I placed an order for fasting labs to be done.  Please have her get the blood work done prior to the appointment.  We can set up for labs in a few weeks and then a tele-visit to discuss the results after.  As she has been well controlled I think it is okay to wait until the next available lab appointment.

## 2020-06-13 LAB
ALBUMIN SERPL-MCNC: 4.5 G/DL (ref 3.5–5.2)
ALBUMIN/GLOB SERPL: 1.9 G/DL
ALP SERPL-CCNC: 61 U/L (ref 39–117)
ALT SERPL-CCNC: 45 U/L (ref 1–33)
AST SERPL-CCNC: 30 U/L (ref 1–32)
BILIRUB SERPL-MCNC: 0.4 MG/DL (ref 0.2–1.2)
BUN SERPL-MCNC: 18 MG/DL (ref 6–20)
BUN/CREAT SERPL: 22 (ref 7–25)
CALCIUM SERPL-MCNC: 9.9 MG/DL (ref 8.6–10.5)
CHLORIDE SERPL-SCNC: 101 MMOL/L (ref 98–107)
CHOLEST SERPL-MCNC: 164 MG/DL (ref 0–200)
CO2 SERPL-SCNC: 27.4 MMOL/L (ref 22–29)
CREAT SERPL-MCNC: 0.82 MG/DL (ref 0.57–1)
FOLATE SERPL-MCNC: >20 NG/ML (ref 4.78–24.2)
GLOBULIN SER CALC-MCNC: 2.4 GM/DL
GLUCOSE SERPL-MCNC: 132 MG/DL (ref 65–99)
HBA1C MFR BLD: 6.5 % (ref 4.8–5.6)
HDLC SERPL-MCNC: 53 MG/DL (ref 40–60)
LDLC SERPL CALC-MCNC: 84 MG/DL (ref 0–100)
POTASSIUM SERPL-SCNC: 4.3 MMOL/L (ref 3.5–5.2)
PROT SERPL-MCNC: 6.9 G/DL (ref 6–8.5)
SODIUM SERPL-SCNC: 139 MMOL/L (ref 136–145)
TRIGL SERPL-MCNC: 134 MG/DL (ref 0–150)
VIT B12 SERPL-MCNC: 1424 PG/ML (ref 211–946)
VLDLC SERPL CALC-MCNC: 26.8 MG/DL

## 2020-06-16 ENCOUNTER — OFFICE VISIT (OUTPATIENT)
Dept: FAMILY MEDICINE CLINIC | Facility: CLINIC | Age: 60
End: 2020-06-16

## 2020-06-16 VITALS
BODY MASS INDEX: 42.33 KG/M2 | TEMPERATURE: 97.8 F | WEIGHT: 230 LBS | SYSTOLIC BLOOD PRESSURE: 122 MMHG | HEART RATE: 74 BPM | HEIGHT: 62 IN | RESPIRATION RATE: 16 BRPM | DIASTOLIC BLOOD PRESSURE: 60 MMHG | OXYGEN SATURATION: 99 %

## 2020-06-16 DIAGNOSIS — J41.0 SIMPLE CHRONIC BRONCHITIS (HCC): ICD-10-CM

## 2020-06-16 DIAGNOSIS — R74.8 ELEVATED LIVER ENZYMES: ICD-10-CM

## 2020-06-16 DIAGNOSIS — E78.2 MIXED HYPERLIPIDEMIA: ICD-10-CM

## 2020-06-16 DIAGNOSIS — E11.9 DIABETES MELLITUS WITHOUT COMPLICATION (HCC): Primary | ICD-10-CM

## 2020-06-16 PROCEDURE — 99214 OFFICE O/P EST MOD 30 MIN: CPT | Performed by: FAMILY MEDICINE

## 2020-06-16 RX ORDER — OMEGA-3-ACID ETHYL ESTERS 1 G/1
CAPSULE, LIQUID FILLED ORAL
Qty: 360 CAPSULE | Refills: 4 | Status: SHIPPED | OUTPATIENT
Start: 2020-06-16 | End: 2021-09-27

## 2020-06-16 RX ORDER — BUDESONIDE AND FORMOTEROL FUMARATE DIHYDRATE 80; 4.5 UG/1; UG/1
AEROSOL RESPIRATORY (INHALATION)
Qty: 3 INHALER | Refills: 3 | Status: SHIPPED | OUTPATIENT
Start: 2020-06-16 | End: 2020-10-26 | Stop reason: SDUPTHER

## 2020-06-16 RX ORDER — METFORMIN HYDROCHLORIDE 500 MG/1
500 TABLET, EXTENDED RELEASE ORAL
Qty: 90 TABLET | Refills: 3 | Status: SHIPPED | OUTPATIENT
Start: 2020-06-16 | End: 2021-03-16 | Stop reason: SDUPTHER

## 2020-06-16 NOTE — PROGRESS NOTES
Tatiana Turner is a 59 y.o. female.     Chief Complaint   Patient presents with   • Diabetes     patient is following up on recent blood work results. pt needs refill on metformin    • Shortness of Breath     patient eneds refil on symbicort   • Hyperlipidemia     patient needs refill on Lovaza       HPI     Pt is a pleasant 59 y.o. YO F here for diabetes, COPD and high cholesterol.      Diabetes Mellitus TII:  controlled.  Patient with chronic diabetes for years.  Asymptomatic without polyuria or polydipsia, no symptoms of hyper or hypoglycemia.  Adherent with medications.  Last hemoglobin A1c of 6.7.  Remaining diabetic screening reviewed.       The following portions of the patient's history were reviewed and updated as appropriate: allergies, current medications, past family history, past medical history, past social history, past surgical history and problem list.    Review of Systems   Constitutional: Negative.  Negative for activity change and appetite change.   HENT: Negative.    Eyes: Negative.    Respiratory: Negative.    Cardiovascular: Negative.    Gastrointestinal: Negative.    Endocrine: Negative.    Genitourinary: Negative.    Musculoskeletal: Negative.    Skin: Negative.    Allergic/Immunologic: Negative.    Neurological: Negative.    Hematological: Negative.    Psychiatric/Behavioral: Negative.    All other systems reviewed and are negative.    I have reviewed the ROS as documented by the MA. Dawna Abarca MD    Objective  Vitals:    06/16/20 1023   BP: 122/60   Pulse: 74   Resp: 16   Temp: 97.8 °F (36.6 °C)   SpO2: 99%     Body mass index is 42.07 kg/m².    Physical Exam   Constitutional: She is oriented to person, place, and time. She appears well-developed and well-nourished. No distress.   HENT:   Head: Normocephalic.   Nose: Nose normal.   Eyes: EOM are normal. No scleral icterus.   Pulmonary/Chest: Effort normal. No respiratory distress.   Musculoskeletal: Normal range of motion.    Neurological: She is alert and oriented to person, place, and time.   Skin: Skin is warm and dry. No rash noted.   Psychiatric: She has a normal mood and affect. Her behavior is normal. Judgment and thought content normal.   Nursing note and vitals reviewed.        Current Outpatient Medications:   •  aspirin 81 MG tablet, Take 1 tablet by mouth daily., Disp: , Rfl:   •  budesonide-formoterol (SYMBICORT) 80-4.5 MCG/ACT inhaler, Symbicort 80-4.5 MCG/ACT Inhalation Aerosol inhale 2 puffs twice daily; 3; 3; 01-Apr-2014; Active, Disp: 3 inhaler, Rfl: 3  •  calcium citrate-vitamin D (CITRACAL+D) 315-200 MG-UNIT per tablet, Take 1 tablet by mouth daily., Disp: , Rfl:   •  Cholecalciferol (VITAMIN D) 1000 UNITS capsule, Take by mouth. Take as directed, Disp: , Rfl:   •  estradiol (VAGIFEM) 10 MCG tablet vaginal tablet, Insert into the vagina., Disp: , Rfl:   •  glucose blood test strip, daily. Use 1 strip daily, Disp: , Rfl:   •  Lancets (FREESTYLE) lancets, Use as directed, Disp: , Rfl:   •  levothyroxine (SYNTHROID) 100 MCG tablet, Take 1 tablet by mouth Daily., Disp: 90 tablet, Rfl: 3  •  lisinopril-hydrochlorothiazide (PRINZIDE,ZESTORETIC) 10-12.5 MG per tablet, Take 1 tablet by mouth Daily., Disp: 90 tablet, Rfl: 3  •  metFORMIN ER (GLUCOPHAGE-XR) 500 MG 24 hr tablet, Take 1 tablet by mouth Daily With Breakfast., Disp: 90 tablet, Rfl: 3  •  omega-3 acid ethyl esters (LOVAZA) 1 g capsule, TAKE 2 CAPSULES TWICE A DAY, Disp: 360 capsule, Rfl: 4  •  Pitavastatin Calcium (Livalo) 4 MG tablet, Take 1 tablet by mouth Daily., Disp: 90 tablet, Rfl: 3  Current outpatient and discharge medications have been reconciled for the patient.  Reviewed by: Dawna Abarca MD      Procedures    Lab Results (most recent)     None          No visits with results within 1 Day(s) from this visit.   Latest known visit with results is:   Telephone on 05/07/2020   Component Date Value Ref Range Status   • Hemoglobin A1C 06/12/2020 6.50* 4.80 -  5.60 % Final    Comment: Hemoglobin A1C Ranges:  Increased Risk for Diabetes  5.7% to 6.4%  Diabetes                     >= 6.5%  Diabetic Goal                < 7.0%     • Vitamin B-12 06/12/2020 1,424* 211 - 946 pg/mL Final    Results may be falsely increased if patient taking Biotin.   • Folate 06/12/2020 >20.00  4.78 - 24.20 ng/mL Final    Results may be falsely increased if patient taking Biotin.   • Total Cholesterol 06/12/2020 164  0 - 200 mg/dL Final   • Triglycerides 06/12/2020 134  0 - 150 mg/dL Final   • HDL Cholesterol 06/12/2020 53  40 - 60 mg/dL Final   • VLDL Cholesterol 06/12/2020 26.8  mg/dL Final   • LDL Cholesterol  06/12/2020 84  0 - 100 mg/dL Final   • Glucose 06/12/2020 132* 65 - 99 mg/dL Final   • BUN 06/12/2020 18  6 - 20 mg/dL Final   • Creatinine 06/12/2020 0.82  0.57 - 1.00 mg/dL Final   • eGFR Non  Am 06/12/2020 72  >60 mL/min/1.73 Final   • eGFR African Am 06/12/2020 87  >60 mL/min/1.73 Final   • BUN/Creatinine Ratio 06/12/2020 22.0  7.0 - 25.0 Final   • Sodium 06/12/2020 139  136 - 145 mmol/L Final   • Potassium 06/12/2020 4.3  3.5 - 5.2 mmol/L Final   • Chloride 06/12/2020 101  98 - 107 mmol/L Final   • Total CO2 06/12/2020 27.4  22.0 - 29.0 mmol/L Final   • Calcium 06/12/2020 9.9  8.6 - 10.5 mg/dL Final   • Total Protein 06/12/2020 6.9  6.0 - 8.5 g/dL Final   • Albumin 06/12/2020 4.50  3.50 - 5.20 g/dL Final   • Globulin 06/12/2020 2.4  gm/dL Final   • A/G Ratio 06/12/2020 1.9  g/dL Final   • Total Bilirubin 06/12/2020 0.4  0.2 - 1.2 mg/dL Final   • Alkaline Phosphatase 06/12/2020 61  39 - 117 U/L Final   • AST (SGOT) 06/12/2020 30  1 - 32 U/L Final   • ALT (SGPT) 06/12/2020 45* 1 - 33 U/L Final             Tatiana was seen today for diabetes, shortness of breath and hyperlipidemia.    Diagnoses and all orders for this visit:    Diabetes mellitus without complication (CMS/HCC)  -     metFORMIN ER (GLUCOPHAGE-XR) 500 MG 24 hr tablet; Take 1 tablet by mouth Daily With Breakfast.  -      Hemoglobin A1c    Simple chronic bronchitis (CMS/HCC)  -     budesonide-formoterol (SYMBICORT) 80-4.5 MCG/ACT inhaler; Symbicort 80-4.5 MCG/ACT Inhalation Aerosol inhale 2 puffs twice daily; 3; 3; 01-Apr-2014; Active    Mixed hyperlipidemia  -     omega-3 acid ethyl esters (LOVAZA) 1 g capsule; TAKE 2 CAPSULES TWICE A DAY    Elevated liver enzymes  -     Comprehensive Metabolic Panel      Diabetes well controlled, continue current meds.  Hyperlipidemia well controlled, continue with pravastatin and omega-3's.  COPD well controlled on Symbicort.  Continue current dose.    Labs reviewed with patient.  Overall hemoglobin A1c has improved continue with lifestyle changes.  Her liver enzymes have slightly risen.  Likely due to increased Tylenol usage due to her arthritic pain.  Discussed decreasing Tylenol usage and rechecking liver enzymes prior to her next appointment.    Return in about 3 months (around 9/16/2020), or if symptoms worsen or fail to improve, for Recheck Diabetes and elevated liver enzymes, labs prior to appointment please..      Dawna Abarca MD

## 2020-07-15 RX ORDER — LEVOTHYROXINE SODIUM 0.1 MG/1
TABLET ORAL
Qty: 90 TABLET | Refills: 3 | Status: SHIPPED | OUTPATIENT
Start: 2020-07-15 | End: 2020-12-08 | Stop reason: SDUPTHER

## 2020-09-16 ENCOUNTER — LAB (OUTPATIENT)
Dept: FAMILY MEDICINE CLINIC | Facility: CLINIC | Age: 60
End: 2020-09-16

## 2020-09-16 DIAGNOSIS — E11.9 DIABETES MELLITUS WITHOUT COMPLICATION (HCC): ICD-10-CM

## 2020-09-16 DIAGNOSIS — R74.8 ELEVATED LIVER ENZYMES: Primary | ICD-10-CM

## 2020-09-16 LAB
ALBUMIN SERPL-MCNC: 4.5 G/DL (ref 3.5–5.2)
ALBUMIN/GLOB SERPL: 2.5 G/DL
ALP SERPL-CCNC: 67 U/L (ref 39–117)
ALT SERPL-CCNC: 29 U/L (ref 1–33)
AST SERPL-CCNC: 18 U/L (ref 1–32)
BILIRUB SERPL-MCNC: 0.6 MG/DL (ref 0–1.2)
BUN SERPL-MCNC: 15 MG/DL (ref 6–20)
BUN/CREAT SERPL: 20.8 (ref 7–25)
CALCIUM SERPL-MCNC: 9.6 MG/DL (ref 8.6–10.5)
CHLORIDE SERPL-SCNC: 100 MMOL/L (ref 98–107)
CO2 SERPL-SCNC: 27.7 MMOL/L (ref 22–29)
CREAT SERPL-MCNC: 0.72 MG/DL (ref 0.57–1)
GLOBULIN SER CALC-MCNC: 1.8 GM/DL
GLUCOSE SERPL-MCNC: 147 MG/DL (ref 65–99)
HBA1C MFR BLD: 6.4 % (ref 4.8–5.6)
POTASSIUM SERPL-SCNC: 5 MMOL/L (ref 3.5–5.2)
PROT SERPL-MCNC: 6.3 G/DL (ref 6–8.5)
SODIUM SERPL-SCNC: 140 MMOL/L (ref 136–145)

## 2020-09-23 ENCOUNTER — OFFICE VISIT (OUTPATIENT)
Dept: FAMILY MEDICINE CLINIC | Facility: CLINIC | Age: 60
End: 2020-09-23

## 2020-09-23 VITALS
DIASTOLIC BLOOD PRESSURE: 84 MMHG | BODY MASS INDEX: 43.98 KG/M2 | TEMPERATURE: 98 F | SYSTOLIC BLOOD PRESSURE: 136 MMHG | HEIGHT: 62 IN | WEIGHT: 239 LBS | HEART RATE: 75 BPM | OXYGEN SATURATION: 99 %

## 2020-09-23 DIAGNOSIS — E11.9 DIABETES MELLITUS WITHOUT COMPLICATION (HCC): Primary | ICD-10-CM

## 2020-09-23 PROCEDURE — 99213 OFFICE O/P EST LOW 20 MIN: CPT | Performed by: FAMILY MEDICINE

## 2020-09-23 NOTE — PROGRESS NOTES
Tatiana Turner is a 59 y.o. female.     Chief Complaint   Patient presents with   • Diabetes Mellitus     3 month follow up after blood work i wore mask and face shields        HPI     Pt is a pleasant 59 y.o. YO female here for diabetes.     Diabetes Mellitus TII:  controlled.  Patient with chronic diabetes for years.  Asymptomatic without polyuria or polydipsia, no symptoms of hyper or hypoglycemia.  Adherent with medications.  Last hemoglobin A1c of 6.4.  Remaining diabetic screening reviewed.     The following portions of the patient's history were reviewed and updated as appropriate: allergies, current medications, past family history, past medical history, past social history, past surgical history and problem list.    Review of Systems   Constitutional: Negative for chills, fatigue, fever and unexpected weight change.   HENT: Negative for ear pain, hearing loss, sinus pressure, sore throat and tinnitus.    Eyes: Negative for pain, discharge and redness.   Respiratory: Negative for cough, shortness of breath and wheezing.    Cardiovascular: Negative for chest pain, palpitations and leg swelling.   Gastrointestinal: Negative for abdominal pain, constipation, diarrhea and nausea.   Endocrine: Negative for cold intolerance and heat intolerance.   Genitourinary: Negative for difficulty urinating, flank pain and urgency.   Musculoskeletal: Negative for back pain, joint swelling and myalgias.   Skin: Negative for rash and wound.   Allergic/Immunologic: Negative for environmental allergies and food allergies.   Neurological: Negative for dizziness, seizures, numbness and headaches.   Hematological: Negative for adenopathy. Does not bruise/bleed easily.   Psychiatric/Behavioral: Negative for decreased concentration, dysphoric mood and sleep disturbance. The patient is not nervous/anxious.    All other systems reviewed and are negative.      Objective  Vitals:    09/23/20 0959   BP: 136/84   Pulse: 75   Temp: 98  °F (36.7 °C)   SpO2: 99%        Physical Exam  Vitals signs and nursing note reviewed.   Constitutional:       General: She is not in acute distress.     Appearance: She is well-developed.   HENT:      Head: Normocephalic and atraumatic.      Nose: Nose normal.   Eyes:      General: No scleral icterus.  Cardiovascular:      Rate and Rhythm: Normal rate and regular rhythm.      Pulses: Normal pulses.   Pulmonary:      Effort: Pulmonary effort is normal. No respiratory distress.   Musculoskeletal: Normal range of motion.      Right foot: Normal range of motion. No deformity.      Left foot: Normal range of motion. No deformity.   Feet:      Right foot:      Protective Sensation: 10 sites tested. 10 sites sensed.      Skin integrity: Skin integrity normal. No blister or callus.      Toenail Condition: Right toenails are normal.      Left foot:      Protective Sensation: 10 sites tested. 10 sites sensed.      Skin integrity: Skin integrity normal. No blister or callus.      Toenail Condition: Left toenails are normal.   Skin:     General: Skin is warm and dry.      Findings: No rash.   Neurological:      Mental Status: She is alert and oriented to person, place, and time.   Psychiatric:         Mood and Affect: Mood normal.         Behavior: Behavior normal.         Thought Content: Thought content normal.         Judgment: Judgment normal.           Current Outpatient Medications:   •  aspirin 81 MG tablet, Take 1 tablet by mouth daily., Disp: , Rfl:   •  calcium citrate-vitamin D (CITRACAL+D) 315-200 MG-UNIT per tablet, Take 1 tablet by mouth daily., Disp: , Rfl:   •  Cholecalciferol (VITAMIN D) 1000 UNITS capsule, Take by mouth. Take as directed, Disp: , Rfl:   •  estradiol (VAGIFEM) 10 MCG tablet vaginal tablet, Insert into the vagina., Disp: , Rfl:   •  glucose blood test strip, daily. Use 1 strip daily, Disp: , Rfl:   •  Lancets (FREESTYLE) lancets, Use as directed, Disp: , Rfl:   •  levothyroxine (SYNTHROID,  LEVOTHROID) 100 MCG tablet, TAKE 1 TABLET DAILY, Disp: 90 tablet, Rfl: 3  •  lisinopril-hydrochlorothiazide (PRINZIDE,ZESTORETIC) 10-12.5 MG per tablet, Take 1 tablet by mouth Daily., Disp: 90 tablet, Rfl: 3  •  metFORMIN ER (GLUCOPHAGE-XR) 500 MG 24 hr tablet, Take 1 tablet by mouth Daily With Breakfast., Disp: 90 tablet, Rfl: 3  •  omega-3 acid ethyl esters (LOVAZA) 1 g capsule, TAKE 2 CAPSULES TWICE A DAY, Disp: 360 capsule, Rfl: 4  •  Pitavastatin Calcium (Livalo) 4 MG tablet, Take 1 tablet by mouth Daily., Disp: 90 tablet, Rfl: 3  •  budesonide-formoterol (SYMBICORT) 80-4.5 MCG/ACT inhaler, Symbicort 80-4.5 MCG/ACT Inhalation Aerosol inhale 2 puffs twice daily; 3; 3; 01-Apr-2014; Active, Disp: 3 inhaler, Rfl: 3    Procedures    Lab Results (most recent)     None        No visits with results within 1 Day(s) from this visit.   Latest known visit with results is:   Orders Only on 09/16/2020   Component Date Value Ref Range Status   • Hemoglobin A1C 09/16/2020 6.40* 4.80 - 5.60 % Final    Comment: Hemoglobin A1C Ranges:  Increased Risk for Diabetes  5.7% to 6.4%  Diabetes                     >= 6.5%  Diabetic Goal                < 7.0%     • Glucose 09/16/2020 147* 65 - 99 mg/dL Final   • BUN 09/16/2020 15  6 - 20 mg/dL Final   • Creatinine 09/16/2020 0.72  0.57 - 1.00 mg/dL Final   • eGFR Non  Am 09/16/2020 83  >60 mL/min/1.73 Final   • eGFR African Am 09/16/2020 101  >60 mL/min/1.73 Final   • BUN/Creatinine Ratio 09/16/2020 20.8  7.0 - 25.0 Final   • Sodium 09/16/2020 140  136 - 145 mmol/L Final   • Potassium 09/16/2020 5.0  3.5 - 5.2 mmol/L Final   • Chloride 09/16/2020 100  98 - 107 mmol/L Final   • Total CO2 09/16/2020 27.7  22.0 - 29.0 mmol/L Final   • Calcium 09/16/2020 9.6  8.6 - 10.5 mg/dL Final   • Total Protein 09/16/2020 6.3  6.0 - 8.5 g/dL Final   • Albumin 09/16/2020 4.50  3.50 - 5.20 g/dL Final   • Globulin 09/16/2020 1.8  gm/dL Final   • A/G Ratio 09/16/2020 2.5  g/dL Final   • Total  Bilirubin 09/16/2020 0.6  0.0 - 1.2 mg/dL Final   • Alkaline Phosphatase 09/16/2020 67  39 - 117 U/L Final   • AST (SGOT) 09/16/2020 18  1 - 32 U/L Final   • ALT (SGPT) 09/16/2020 29  1 - 33 U/L Final           Tatiana was seen today for diabetes mellitus.    Diagnoses and all orders for this visit:    Diabetes mellitus without complication (CMS/HCC)    Diabetes well controlled with hemoglobin A1c of 6.4.  Continue with metformin.  Discussed continuing with lifestyle modification.  Encouraged consistency.  Follow-up in 3 months for hemoglobin A1c.  Diabetic foot exam performed today.  Diabetic eye exam up-to-date and scanned to chart.    Follow-up for annual exam in 3 months.  Fasting labs prior to appointment, CMP, lipids, hemoglobin A1c, B12, TSH, hepatitis C antibody      Return in about 3 months (around 12/23/2020), or if symptoms worsen or fail to improve, for Annual physical with fasting labs prior.      Dawna Abarca MD    Mask and face shield with appropriate PPE worn during patient encounter.

## 2020-10-23 DIAGNOSIS — J41.0 SIMPLE CHRONIC BRONCHITIS (HCC): ICD-10-CM

## 2020-10-23 RX ORDER — DILTIAZEM HYDROCHLORIDE 60 MG/1
TABLET, FILM COATED ORAL
Qty: 30.6 G | Refills: 3 | Status: CANCELLED | OUTPATIENT
Start: 2020-10-23

## 2020-10-26 RX ORDER — FLUTICASONE PROPIONATE AND SALMETEROL XINAFOATE 115; 21 UG/1; UG/1
2 AEROSOL, METERED RESPIRATORY (INHALATION)
Qty: 12 G | Refills: 11 | Status: SHIPPED | OUTPATIENT
Start: 2020-10-26 | End: 2020-12-08 | Stop reason: SDUPTHER

## 2020-11-03 ENCOUNTER — FLU SHOT (OUTPATIENT)
Dept: FAMILY MEDICINE CLINIC | Facility: CLINIC | Age: 60
End: 2020-11-03

## 2020-11-03 DIAGNOSIS — Z23 NEED FOR INFLUENZA VACCINATION: Primary | ICD-10-CM

## 2020-11-03 PROCEDURE — 90686 IIV4 VACC NO PRSV 0.5 ML IM: CPT | Performed by: FAMILY MEDICINE

## 2020-11-03 PROCEDURE — 90471 IMMUNIZATION ADMIN: CPT | Performed by: FAMILY MEDICINE

## 2020-11-30 DIAGNOSIS — E78.2 MIXED HYPERLIPIDEMIA: ICD-10-CM

## 2020-11-30 DIAGNOSIS — I10 ESSENTIAL HYPERTENSION: ICD-10-CM

## 2020-11-30 DIAGNOSIS — E03.9 ACQUIRED HYPOTHYROIDISM: ICD-10-CM

## 2020-11-30 DIAGNOSIS — E11.9 DIABETES MELLITUS WITHOUT COMPLICATION (HCC): Primary | ICD-10-CM

## 2020-11-30 DIAGNOSIS — E55.9 HYPOVITAMINOSIS D: ICD-10-CM

## 2020-11-30 DIAGNOSIS — R74.8 ELEVATED LIVER ENZYMES: ICD-10-CM

## 2020-12-02 LAB
ALBUMIN SERPL-MCNC: 4.3 G/DL (ref 3.5–5.2)
ALBUMIN/GLOB SERPL: 2 G/DL
ALP SERPL-CCNC: 67 U/L (ref 39–117)
ALT SERPL-CCNC: 34 U/L (ref 1–33)
AST SERPL-CCNC: 23 U/L (ref 1–32)
BILIRUB SERPL-MCNC: 0.5 MG/DL (ref 0–1.2)
BUN SERPL-MCNC: 18 MG/DL (ref 6–20)
BUN/CREAT SERPL: 27.3 (ref 7–25)
CALCIUM SERPL-MCNC: 9.4 MG/DL (ref 8.6–10.5)
CHLORIDE SERPL-SCNC: 99 MMOL/L (ref 98–107)
CHOLEST SERPL-MCNC: 208 MG/DL (ref 0–200)
CO2 SERPL-SCNC: 26.7 MMOL/L (ref 22–29)
CREAT SERPL-MCNC: 0.66 MG/DL (ref 0.57–1)
GLOBULIN SER CALC-MCNC: 2.1 GM/DL
GLUCOSE SERPL-MCNC: 139 MG/DL (ref 65–99)
HBA1C MFR BLD: 6.94 % (ref 4.8–5.6)
HCV AB S/CO SERPL IA: <0.1 S/CO RATIO (ref 0–0.9)
HDLC SERPL-MCNC: 68 MG/DL (ref 40–60)
LDLC SERPL CALC-MCNC: 98 MG/DL (ref 0–100)
LDLC/HDLC SERPL: 1.31 {RATIO}
POTASSIUM SERPL-SCNC: 4.5 MMOL/L (ref 3.5–5.2)
PROT SERPL-MCNC: 6.4 G/DL (ref 6–8.5)
SODIUM SERPL-SCNC: 141 MMOL/L (ref 136–145)
TRIGL SERPL-MCNC: 255 MG/DL (ref 0–150)
TSH SERPL DL<=0.005 MIU/L-ACNC: 5.96 UIU/ML (ref 0.27–4.2)
VIT B12 SERPL-MCNC: 495 PG/ML (ref 211–946)
VLDLC SERPL CALC-MCNC: 42 MG/DL (ref 5–40)

## 2020-12-04 ENCOUNTER — APPOINTMENT (OUTPATIENT)
Dept: WOMENS IMAGING | Facility: HOSPITAL | Age: 60
End: 2020-12-04

## 2020-12-04 PROCEDURE — 77063 BREAST TOMOSYNTHESIS BI: CPT | Performed by: RADIOLOGY

## 2020-12-04 PROCEDURE — 77067 SCR MAMMO BI INCL CAD: CPT | Performed by: RADIOLOGY

## 2020-12-08 ENCOUNTER — OFFICE VISIT (OUTPATIENT)
Dept: FAMILY MEDICINE CLINIC | Facility: CLINIC | Age: 60
End: 2020-12-08

## 2020-12-08 VITALS
DIASTOLIC BLOOD PRESSURE: 84 MMHG | HEIGHT: 62 IN | WEIGHT: 242 LBS | BODY MASS INDEX: 44.53 KG/M2 | HEART RATE: 85 BPM | SYSTOLIC BLOOD PRESSURE: 128 MMHG | TEMPERATURE: 97.8 F | OXYGEN SATURATION: 98 %

## 2020-12-08 DIAGNOSIS — E03.9 ACQUIRED HYPOTHYROIDISM: ICD-10-CM

## 2020-12-08 DIAGNOSIS — E11.9 DIABETES MELLITUS WITHOUT COMPLICATION (HCC): ICD-10-CM

## 2020-12-08 DIAGNOSIS — E78.2 MIXED HYPERLIPIDEMIA: ICD-10-CM

## 2020-12-08 DIAGNOSIS — E66.01 CLASS 3 SEVERE OBESITY DUE TO EXCESS CALORIES WITH SERIOUS COMORBIDITY AND BODY MASS INDEX (BMI) OF 40.0 TO 44.9 IN ADULT (HCC): ICD-10-CM

## 2020-12-08 DIAGNOSIS — Z00.00 HEALTH MAINTENANCE EXAMINATION: Primary | ICD-10-CM

## 2020-12-08 DIAGNOSIS — K21.9 GASTROESOPHAGEAL REFLUX DISEASE, UNSPECIFIED WHETHER ESOPHAGITIS PRESENT: ICD-10-CM

## 2020-12-08 PROCEDURE — 99396 PREV VISIT EST AGE 40-64: CPT | Performed by: FAMILY MEDICINE

## 2020-12-08 PROCEDURE — 99213 OFFICE O/P EST LOW 20 MIN: CPT | Performed by: FAMILY MEDICINE

## 2020-12-08 RX ORDER — FAMOTIDINE 40 MG/1
40 TABLET, FILM COATED ORAL DAILY
Qty: 30 TABLET | Refills: 3 | Status: SHIPPED | OUTPATIENT
Start: 2020-12-08

## 2020-12-08 RX ORDER — LEVOTHYROXINE SODIUM 112 UG/1
112 TABLET ORAL DAILY
Qty: 90 TABLET | Refills: 1 | Status: SHIPPED | OUTPATIENT
Start: 2020-12-08 | End: 2021-03-16 | Stop reason: SDUPTHER

## 2020-12-08 RX ORDER — FAMOTIDINE 40 MG/1
40 TABLET, FILM COATED ORAL DAILY
Qty: 30 TABLET | Refills: 3 | Status: SHIPPED | OUTPATIENT
Start: 2020-12-08 | End: 2020-12-08 | Stop reason: SDUPTHER

## 2020-12-08 RX ORDER — FLUTICASONE PROPIONATE AND SALMETEROL XINAFOATE 115; 21 UG/1; UG/1
2 AEROSOL, METERED RESPIRATORY (INHALATION)
Qty: 36 G | Refills: 3 | Status: SHIPPED | OUTPATIENT
Start: 2020-12-08 | End: 2021-12-30 | Stop reason: SDUPTHER

## 2020-12-08 NOTE — PROGRESS NOTES
Tatiana Turner is a 60 y.o. female.     Chief Complaint   Patient presents with   • Annual Exam     no complains    • Heartburn     last month reflux getting really bad worse a night  had it previs and        HPI     Pt is a pleasant 60 y.o. YO female here for annual exam, Diabetes that is well controlled but worsening.GERD that is not well controlled     Health Maintenance   Topic Date Due   • Hepatitis B (1 of 3 - Risk 3-dose series) 10/25/1981   • TDAP/TD VACCINES (1 - Tdap) 10/25/1981   • ZOSTER VACCINE (1 of 2) 10/25/2012   • LUNG CANCER SCREENING  11/19/2020   • MAMMOGRAM  12/01/2020   • HEMOGLOBIN A1C  06/01/2021   • DIABETIC EYE EXAM  08/03/2021   • DIABETIC FOOT EXAM  09/23/2021   • PAP SMEAR  12/01/2021   • LIPID PANEL  12/01/2021   • ANNUAL PHYSICAL  12/09/2021   • COLONOSCOPY  01/01/2024   • HEPATITIS C SCREENING  Completed   • Pneumococcal Vaccine 0-64  Completed   • INFLUENZA VACCINE  Completed   • URINE MICROALBUMIN  Discontinued       Diet: not eating as well, snacking, bored at home   Exercise: lifting weights, not doing walking.   GYN: UTD with GYN Dr. Romero.  Requested records of mammogram and Pap smear.  Immunizations: Up-to-date..      Diabetes well controlled on current meds, worsening    GERD - chronic issues, some belching, epigastric pain, belching.  Not sure what medications to be on, was on Prilosec in the past.  Not sure about dietary modification.  Has some questions.    The following portions of the patient's history were reviewed and updated as appropriate: allergies, current medications, past family history, past medical history, past social history, past surgical history and problem list.    Review of Systems   Constitutional: Negative for chills, fatigue, fever and unexpected weight change.   HENT: Negative for ear pain, hearing loss, sinus pressure, sore throat and tinnitus.    Eyes: Negative for pain, discharge and redness.   Respiratory: Negative for cough, shortness  of breath and wheezing.    Cardiovascular: Negative for chest pain, palpitations and leg swelling.   Gastrointestinal: Negative for abdominal pain, constipation, diarrhea and nausea.        Reflux   Endocrine: Negative for cold intolerance and heat intolerance.   Genitourinary: Negative for difficulty urinating, flank pain and urgency.   Musculoskeletal: Negative for back pain, joint swelling and myalgias.   Skin: Negative for rash and wound.   Allergic/Immunologic: Negative for environmental allergies and food allergies.   Neurological: Negative for dizziness, seizures, numbness and headaches.   Hematological: Negative for adenopathy. Does not bruise/bleed easily.   Psychiatric/Behavioral: Negative for decreased concentration, dysphoric mood and sleep disturbance. The patient is not nervous/anxious.    All other systems reviewed and are negative.      Objective  Vitals:    12/08/20 0954   BP: 128/84   Pulse: 85   Temp: 97.8 °F (36.6 °C)   SpO2: 98%   Body mass index is 44.26 kg/m².       Physical Exam  Vitals signs and nursing note reviewed.   Constitutional:       General: She is not in acute distress.     Appearance: She is well-developed.   HENT:      Head: Normocephalic.      Nose: Nose normal.   Cardiovascular:      Rate and Rhythm: Normal rate and regular rhythm.      Heart sounds: Normal heart sounds. No murmur.   Pulmonary:      Effort: Pulmonary effort is normal. No respiratory distress.      Breath sounds: Normal breath sounds.   Abdominal:      General: Abdomen is flat.      Palpations: Abdomen is soft.      Tenderness: There is no abdominal tenderness.   Musculoskeletal: Normal range of motion.   Skin:     General: Skin is warm and dry.      Findings: No rash.   Neurological:      Mental Status: She is alert and oriented to person, place, and time.   Psychiatric:         Behavior: Behavior normal.         Thought Content: Thought content normal.         Judgment: Judgment normal.           Current  Outpatient Medications:   •  aspirin 81 MG tablet, Take 1 tablet by mouth daily., Disp: , Rfl:   •  calcium citrate-vitamin D (CITRACAL+D) 315-200 MG-UNIT per tablet, Take 1 tablet by mouth daily., Disp: , Rfl:   •  Cholecalciferol (VITAMIN D) 1000 UNITS capsule, Take by mouth. Take as directed, Disp: , Rfl:   •  estradiol (VAGIFEM) 10 MCG tablet vaginal tablet, Insert into the vagina., Disp: , Rfl:   •  fluticasone-salmeterol (Advair HFA) 115-21 MCG/ACT inhaler, Inhale 2 puffs 2 (Two) Times a Day., Disp: 36 g, Rfl: 3  •  glucose blood test strip, daily. Use 1 strip daily, Disp: , Rfl:   •  Lancets (FREESTYLE) lancets, Use as directed, Disp: , Rfl:   •  levothyroxine (SYNTHROID, LEVOTHROID) 112 MCG tablet, Take 1 tablet by mouth Daily., Disp: 90 tablet, Rfl: 1  •  lisinopril-hydrochlorothiazide (PRINZIDE,ZESTORETIC) 10-12.5 MG per tablet, Take 1 tablet by mouth Daily., Disp: 90 tablet, Rfl: 3  •  metFORMIN ER (GLUCOPHAGE-XR) 500 MG 24 hr tablet, Take 1 tablet by mouth Daily With Breakfast., Disp: 90 tablet, Rfl: 3  •  omega-3 acid ethyl esters (LOVAZA) 1 g capsule, TAKE 2 CAPSULES TWICE A DAY, Disp: 360 capsule, Rfl: 4  •  Pitavastatin Calcium (Livalo) 4 MG tablet, Take 1 tablet by mouth Daily., Disp: 90 tablet, Rfl: 3  •  famotidine (PEPCID) 40 MG tablet, Take 1 tablet by mouth Daily., Disp: 30 tablet, Rfl: 3    Procedures    Lab Results (most recent)     None        No visits with results within 1 Day(s) from this visit.   Latest known visit with results is:   Orders Only on 11/30/2020   Component Date Value Ref Range Status   • Glucose 12/01/2020 139* 65 - 99 mg/dL Final   • BUN 12/01/2020 18  6 - 20 mg/dL Final   • Creatinine 12/01/2020 0.66  0.57 - 1.00 mg/dL Final   • eGFR Non African Am 12/01/2020 92  >60 mL/min/1.73 Final   • eGFR African Am 12/01/2020 111  >60 mL/min/1.73 Final   • BUN/Creatinine Ratio 12/01/2020 27.3* 7.0 - 25.0 Final   • Sodium 12/01/2020 141  136 - 145 mmol/L Final   • Potassium  12/01/2020 4.5  3.5 - 5.2 mmol/L Final   • Chloride 12/01/2020 99  98 - 107 mmol/L Final   • Total CO2 12/01/2020 26.7  22.0 - 29.0 mmol/L Final   • Calcium 12/01/2020 9.4  8.6 - 10.5 mg/dL Final   • Total Protein 12/01/2020 6.4  6.0 - 8.5 g/dL Final   • Albumin 12/01/2020 4.30  3.50 - 5.20 g/dL Final   • Globulin 12/01/2020 2.1  gm/dL Final   • A/G Ratio 12/01/2020 2.0  g/dL Final   • Total Bilirubin 12/01/2020 0.5  0.0 - 1.2 mg/dL Final   • Alkaline Phosphatase 12/01/2020 67  39 - 117 U/L Final   • AST (SGOT) 12/01/2020 23  1 - 32 U/L Final   • ALT (SGPT) 12/01/2020 34* 1 - 33 U/L Final   • Total Cholesterol 12/01/2020 208* 0 - 200 mg/dL Final   • Triglycerides 12/01/2020 255* 0 - 150 mg/dL Final   • HDL Cholesterol 12/01/2020 68* 40 - 60 mg/dL Final   • VLDL Cholesterol Juan 12/01/2020 42* 5 - 40 mg/dL Final   • LDL Chol Calc (NIH) 12/01/2020 98  0 - 100 mg/dL Final   • LDL/HDL RATIO 12/01/2020 1.31   Final   • Hemoglobin A1C 12/01/2020 6.94* 4.80 - 5.60 % Final    Comment: Hemoglobin A1C Ranges:  Increased Risk for Diabetes  5.7% to 6.4%  Diabetes                     >= 6.5%  Diabetic Goal                < 7.0%     • Vitamin B-12 12/01/2020 495  211 - 946 pg/mL Final    Results may be falsely increased if patient taking Biotin.   • TSH 12/01/2020 5.960* 0.270 - 4.200 uIU/mL Final   • Hep C Virus Ab 12/01/2020 <0.1  0.0 - 0.9 s/co ratio Final    Comment:                                   Negative:     < 0.8                               Indeterminate: 0.8 - 0.9                                    Positive:     > 0.9   The CDC recommends that a positive HCV antibody result   be followed up with a HCV Nucleic Acid Amplification   test (023680).             Diagnoses and all orders for this visit:    1. Health maintenance examination (Primary)    2. Diabetes mellitus without complication (CMS/HCC)    3. Mixed hyperlipidemia    4. Class 3 severe obesity due to excess calories with serious comorbidity and body mass  index (BMI) of 40.0 to 44.9 in adult (CMS/[facility])    5. Gastroesophageal reflux disease, unspecified whether esophagitis present  -     Discontinue: famotidine (PEPCID) 40 MG tablet; Take 1 tablet by mouth Daily.  Dispense: 30 tablet; Refill: 3  -     famotidine (PEPCID) 40 MG tablet; Take 1 tablet by mouth Daily.  Dispense: 30 tablet; Refill: 3    6. Acquired hypothyroidism  -     levothyroxine (SYNTHROID, LEVOTHROID) 112 MCG tablet; Take 1 tablet by mouth Daily.  Dispense: 90 tablet; Refill: 1    Other orders  -     fluticasone-salmeterol (Advair HFA) 115-21 MCG/ACT inhaler; Inhale 2 puffs 2 (Two) Times a Day.  Dispense: 36 g; Refill: 3        Here for annual exam, fasting labs discussed with patient as above.  See below for thyroid overall worsening blood sugar, cholesterol and thyroid function.  Will stay on same dose of cholesterol medication, recommendations for dietary changes with less than 100 g of carbs a day, decrease saturated fats and increase water and exercise., immunizations up-to-date, colon cancer screening up-to-date, GYN health up-to-date with Dr. Romero, cardiovascular screening negative for symptoms, counseled patient to increase vegetable intake, water and 150 minutes of exercise weekly combining weightbearing exercises and aerobic activity.      Diabetes well controlled but worsening.      Discussed GERD lifestyle changes with patient.  With H2 blocker and dietary changes, [facility] handout given.    Hyperlipidemia that is not well controlled and worsening.  Continue the same meds.    Hypothyroidism not well controlled with TSH of 5, increase levothyroxine from 100-1 12.  Follow-up in 12 weeks.     Labs prior to next appointment, TSH, free T4, hemoglobin A1c, lipids and CMP.    Return in about 3 months (around 3/8/2021), or if symptoms worsen or fail to improve, for Recheck diabetes and hypothyroidism with fasting labs prior..      Dawna Abarca MD    Mask and protective eyewear worn  by myself and medical assistant during entire patient encounter.

## 2021-03-10 DIAGNOSIS — E11.9 DIABETES MELLITUS WITHOUT COMPLICATION (HCC): Primary | ICD-10-CM

## 2021-03-10 DIAGNOSIS — E78.5 HYPERLIPIDEMIA, UNSPECIFIED HYPERLIPIDEMIA TYPE: ICD-10-CM

## 2021-03-10 DIAGNOSIS — R74.8 ELEVATED LIVER ENZYMES: ICD-10-CM

## 2021-03-10 DIAGNOSIS — E03.9 ACQUIRED HYPOTHYROIDISM: ICD-10-CM

## 2021-03-10 LAB
ALBUMIN SERPL-MCNC: 4.2 G/DL (ref 3.5–5.2)
ALBUMIN/GLOB SERPL: 1.8 G/DL
ALP SERPL-CCNC: 75 U/L (ref 39–117)
ALT SERPL-CCNC: 36 U/L (ref 1–33)
AST SERPL-CCNC: 28 U/L (ref 1–32)
BILIRUB SERPL-MCNC: 0.4 MG/DL (ref 0–1.2)
BUN SERPL-MCNC: 17 MG/DL (ref 6–20)
BUN/CREAT SERPL: 24.6 (ref 7–25)
CALCIUM SERPL-MCNC: 9.8 MG/DL (ref 8.6–10.5)
CHLORIDE SERPL-SCNC: 100 MMOL/L (ref 98–107)
CHOLEST SERPL-MCNC: 193 MG/DL (ref 0–200)
CO2 SERPL-SCNC: 28.2 MMOL/L (ref 22–29)
CREAT SERPL-MCNC: 0.69 MG/DL (ref 0.57–1)
GLOBULIN SER CALC-MCNC: 2.4 GM/DL
GLUCOSE SERPL-MCNC: 150 MG/DL (ref 65–99)
HBA1C MFR BLD: 7.1 % (ref 4.8–5.6)
HDLC SERPL-MCNC: 57 MG/DL (ref 40–60)
LDLC SERPL CALC-MCNC: 103 MG/DL (ref 0–100)
LDLC/HDLC SERPL: 1.72 {RATIO}
POTASSIUM SERPL-SCNC: 5.3 MMOL/L (ref 3.5–5.2)
PROT SERPL-MCNC: 6.6 G/DL (ref 6–8.5)
SODIUM SERPL-SCNC: 139 MMOL/L (ref 136–145)
T4 FREE SERPL-MCNC: 1.46 NG/DL (ref 0.93–1.7)
TRIGL SERPL-MCNC: 191 MG/DL (ref 0–150)
TSH SERPL DL<=0.005 MIU/L-ACNC: 5.01 UIU/ML (ref 0.27–4.2)
VLDLC SERPL CALC-MCNC: 33 MG/DL (ref 5–40)

## 2021-03-16 ENCOUNTER — OFFICE VISIT (OUTPATIENT)
Dept: FAMILY MEDICINE CLINIC | Facility: CLINIC | Age: 61
End: 2021-03-16

## 2021-03-16 VITALS
DIASTOLIC BLOOD PRESSURE: 82 MMHG | HEART RATE: 87 BPM | SYSTOLIC BLOOD PRESSURE: 126 MMHG | BODY MASS INDEX: 44.35 KG/M2 | WEIGHT: 241 LBS | OXYGEN SATURATION: 100 % | TEMPERATURE: 97.9 F | HEIGHT: 62 IN

## 2021-03-16 DIAGNOSIS — E11.9 DIABETES MELLITUS WITHOUT COMPLICATION (HCC): ICD-10-CM

## 2021-03-16 DIAGNOSIS — E66.01 MORBIDLY OBESE (HCC): ICD-10-CM

## 2021-03-16 DIAGNOSIS — E03.9 ACQUIRED HYPOTHYROIDISM: Primary | ICD-10-CM

## 2021-03-16 PROCEDURE — 99214 OFFICE O/P EST MOD 30 MIN: CPT | Performed by: FAMILY MEDICINE

## 2021-03-16 RX ORDER — METFORMIN HYDROCHLORIDE 500 MG/1
500 TABLET, EXTENDED RELEASE ORAL
Qty: 90 TABLET | Refills: 3 | Status: SHIPPED | OUTPATIENT
Start: 2021-03-16 | End: 2022-04-01 | Stop reason: SDUPTHER

## 2021-03-16 RX ORDER — LEVOTHYROXINE SODIUM 0.12 MG/1
125 TABLET ORAL DAILY
Qty: 90 TABLET | Refills: 1 | Status: SHIPPED | OUTPATIENT
Start: 2021-03-16 | End: 2021-09-10 | Stop reason: SDUPTHER

## 2021-03-16 RX ORDER — CHLORAL HYDRATE 500 MG
2 CAPSULE ORAL
COMMUNITY
End: 2021-03-16

## 2021-03-16 NOTE — PROGRESS NOTES
"Chief Complaint  Diabetes Mellitus (no complains follow up blood work ) and Hyperlipidemia (no complains follow up blood work )    Subjective          Tatiana Turner presents to [facility]  History of Present Illness  Diabetes, has been working hard, she has avoided red meat and alchol, exercising 5 days a week, more fruits and veggies, and having more fruits, apples, strawberries and blueberries.     Objective   Vital Signs:   /82   Pulse 87   Temp 97.9 °F (36.6 °C)   Ht 157.5 cm (62\")   Wt 109 kg (241 lb)   SpO2 100%   BMI 44.08 kg/m²     Physical Exam  Vitals and nursing note reviewed.   Constitutional:       General: She is not in acute distress.     Appearance: She is well-developed.   HENT:      Head: Normocephalic.      Nose: Nose normal.   Eyes:      General: No scleral icterus.  Pulmonary:      Effort: Pulmonary effort is normal. No respiratory distress.   Musculoskeletal:         General: Normal range of motion.   Skin:     General: Skin is warm and dry.      Findings: No rash.   Neurological:      Mental Status: She is alert and oriented to person, place, and time.   Psychiatric:         Behavior: Behavior normal.         Thought Content: Thought content normal.         Judgment: Judgment normal.        Result Review :   The following data was reviewed by: Dawna Abarca MD on 03/16/2021:  CMP    CMP 9/16/20 12/1/20 3/10/21   Glucose 147 (A) 139 (A) 150 (A)   BUN 15 18 17   Creatinine 0.72 0.66 0.69   eGFR Non  Am 83 92 87   eGFR  Am 101 111 106   Sodium 140 141 139   Potassium 5.0 4.5 5.3 (A)   Chloride 100 99 100   Calcium 9.6 9.4 9.8   Total Protein 6.3 6.4 6.6   Albumin 4.50 4.30 4.20   Globulin 1.8 2.1 2.4   Total Bilirubin 0.6 0.5 0.4   Alkaline Phosphatase 67 67 75   AST (SGOT) 18 23 28   ALT (SGPT) 29 34 (A) 36 (A)   (A) Abnormal value       Comments are available for some flowsheets but are not being displayed.                 Lipid Panel "    Lipid Panel 6/12/20 12/1/20 3/10/21   Total Cholesterol 164 208 (A) 193   Triglycerides 134 255 (A) 191 (A)   HDL Cholesterol 53 68 (A) 57   VLDL Cholesterol 26.8 42 (A) 33   LDL Cholesterol  84 98 103 (A)   LDL/HDL Ratio  1.31 1.72   (A) Abnormal value       Comments are available for some flowsheets but are not being displayed.           TSH    TSH 12/1/20 3/10/21   TSH 5.960 (A) 5.010 (A)   (A) Abnormal value            Most Recent A1C    HGBA1C Most Recent 3/10/21   Hemoglobin A1C 7.10 (A)   (A) Abnormal value       Comments are available for some flowsheets but are not being displayed.                     Assessment and Plan    Diagnoses and all orders for this visit:    1. Acquired hypothyroidism (Primary)  -     levothyroxine (SYNTHROID, LEVOTHROID) 125 MCG tablet; Take 1 tablet by mouth Daily.  Dispense: 90 tablet; Refill: 1  -     Thyroid Panel With TSH    2. Diabetes mellitus without complication (CMS/HCC)  -     metFORMIN ER (GLUCOPHAGE-XR) 500 MG 24 hr tablet; Take 1 tablet by mouth Daily With Breakfast.  Dispense: 90 tablet; Refill: 3  -     Hemoglobin A1c    3. Morbidly obese (CMS/HCC)    Hypothyroidism, still with elevated TSH despite increased dose, and symptoms of fatigue.  Increase levothyroxine from 112 mcg daily to 125 mcg daily.  Repeat labs in 3 months.    Diabetes, worsening slightly, likely due to increased cereal and banana intake.  Continue with exercising and other dietary changes.  Possibly consider oatmeal for breakfast.    Obesity, stable.  She is making aggressive changes but I do think over consuming fruits especially bananas and other simple carbs.  Discussed continuing with fruits but not a banana and cereal every morning.  Consider oatmeal, blueberries, egg for breakfast.       Follow Up   Return in about 3 months (around 6/16/2021), or if symptoms worsen or fail to improve, for Recheck thyroid and Diabetes with labs prior .  Patient was given instructions and counseling  regarding her condition or for health maintenance advice. Please see specific information pulled into the AVS if appropriate. Medical assistant and I wore mask and eyewear protection during entire encounter.  Patient wore mask.    Dawna Abarca MD

## 2021-04-29 RX ORDER — LISINOPRIL AND HYDROCHLOROTHIAZIDE 12.5; 1 MG/1; MG/1
TABLET ORAL
Qty: 90 TABLET | Refills: 1 | Status: SHIPPED | OUTPATIENT
Start: 2021-04-29 | End: 2021-10-11

## 2021-04-29 RX ORDER — PITAVASTATIN CALCIUM 4.18 MG/1
TABLET, FILM COATED ORAL
Qty: 90 TABLET | Refills: 1 | Status: SHIPPED | OUTPATIENT
Start: 2021-04-29 | End: 2021-05-10 | Stop reason: SDUPTHER

## 2021-05-10 RX ORDER — PITAVASTATIN CALCIUM 4.18 MG/1
1 TABLET, FILM COATED ORAL DAILY
Qty: 90 TABLET | Refills: 1 | Status: SHIPPED | OUTPATIENT
Start: 2021-05-10 | End: 2021-12-27

## 2021-06-12 LAB
FT4I SERPL CALC-MCNC: 2.8 (ref 1.2–4.9)
HBA1C MFR BLD: 7.1 % (ref 4.8–5.6)
T3RU NFR SERPL: 33 % (ref 24–39)
T4 SERPL-MCNC: 8.5 UG/DL (ref 4.5–12)
TSH SERPL DL<=0.005 MIU/L-ACNC: 3.18 UIU/ML (ref 0.45–4.5)

## 2021-06-18 ENCOUNTER — OFFICE VISIT (OUTPATIENT)
Dept: FAMILY MEDICINE CLINIC | Facility: CLINIC | Age: 61
End: 2021-06-18

## 2021-06-18 VITALS
TEMPERATURE: 98.6 F | DIASTOLIC BLOOD PRESSURE: 82 MMHG | HEART RATE: 83 BPM | WEIGHT: 245 LBS | SYSTOLIC BLOOD PRESSURE: 134 MMHG | BODY MASS INDEX: 45.08 KG/M2 | HEIGHT: 62 IN | OXYGEN SATURATION: 98 %

## 2021-06-18 DIAGNOSIS — E66.01 CLASS 3 SEVERE OBESITY DUE TO EXCESS CALORIES WITH SERIOUS COMORBIDITY AND BODY MASS INDEX (BMI) OF 40.0 TO 44.9 IN ADULT (HCC): ICD-10-CM

## 2021-06-18 DIAGNOSIS — E03.9 ACQUIRED HYPOTHYROIDISM: Primary | ICD-10-CM

## 2021-06-18 DIAGNOSIS — E11.9 DIABETES MELLITUS WITHOUT COMPLICATION (HCC): ICD-10-CM

## 2021-06-18 PROCEDURE — 99214 OFFICE O/P EST MOD 30 MIN: CPT | Performed by: FAMILY MEDICINE

## 2021-06-18 NOTE — PROGRESS NOTES
"Chief Complaint  Hypothyroidism (no complains doing ok no complains ) and Diabetes Mellitus ( no complains )    Subjective          Tatiana Turner presents to [facility]  History of Present Illness    L ankle pain - stretched a ligament and been wearing a boot.  Doing better, not welling.     DM well controlled -she has been working really hard to eat healthy.  Only eating blueberries strawberries and cantaloupe for fruit, lots of vegetables, lean meats.  Occasional snacks but not often.  Cereal twice a week.  She does enjoys exercising, has been riding a stationary bike at the [employer].    Hypothyroidism improved and well controlled.      [place], very frustrated and wanting to lose weight.  Gained about 5 pounds since last appointment but also injured her left ankle.  She would like to discuss various pharmacologic options as she has been consistent with diet and exercise for some time now.    Objective   Vital Signs:   /82   Pulse 83   Temp 98.6 °F (37 °C)   Ht 157.5 cm (62\")   Wt 111 kg (245 lb)   SpO2 98%   BMI 44.81 kg/m²     Physical Exam  Vitals and nursing note reviewed.   Constitutional:       General: She is not in acute distress.     Appearance: She is well-developed.   HENT:      Head: Normocephalic.      Nose: Nose normal.   Eyes:      General: No scleral icterus.  Pulmonary:      Effort: Pulmonary effort is normal. No respiratory distress.   Musculoskeletal:         General: Normal range of motion.   Skin:     General: Skin is warm and dry.      Findings: No rash.   Neurological:      Mental Status: She is alert and oriented to person, place, and time.   Psychiatric:         Behavior: Behavior normal.         Thought Content: Thought content normal.         Judgment: Judgment normal.        Result Review :   The following data was reviewed by: Dawna Abarca MD on 06/18/2021:  TSH    TSH 12/1/20 3/10/21 6/11/21   TSH 5.960 (A) 5.010 (A) 3.180   (A) Abnormal " value            Most Recent A1C    HGBA1C Most Recent 6/11/21   Hemoglobin A1C 7.10 (A)   (A) Abnormal value       Comments are available for some flowsheets but are not being displayed.                     Assessment and Plan    Diagnoses and all orders for this visit:    1. Acquired hypothyroidism (Primary)    2. Diabetes mellitus without complication (CMS/[facility])    3. Class 3 severe obesity due to excess calories with serious comorbidity and body mass index (BMI) of 40.0 to 44.9 in adult (CMS/[facility])  -     naltrexone-bupropion ER (CONTRAVE) 8-90 MG tablet; Take 2 tablets by mouth 2 (Two) Times a Day.  Dispense: 60 tablet; Refill: 2    Hypothyroidism and diabetes well controlled.  Discussed recent labs with patient.  Also with left ankle hyperextension injury, has follow-up with Ortho next week.  Likely will have her boot off soon.  I think this will help her continue with the regular exercise she has been doing.    Obesity not well controlled, she has been very frustrated, does not like she has been doing really well with lifestyle measures and encouraged her with these changes.  I do think pharmacologic therapy would help.  Prescription for Contrave as above and follow-up in 1 month with Goal - weight <240.      Follow Up   Return in about 4 weeks (around 7/16/2021), or if symptoms worsen or fail to improve, for Recheck Obestiy .  Patient was given instructions and counseling regarding her condition or for health maintenance advice. Please see specific information pulled into the AVS if appropriate. Medical assistant and I wore mask and eyewear protection during entire encounter.  Patient wore mask.    Dawna Abarca MD

## 2021-07-16 ENCOUNTER — OFFICE VISIT (OUTPATIENT)
Dept: FAMILY MEDICINE CLINIC | Facility: CLINIC | Age: 61
End: 2021-07-16

## 2021-07-16 VITALS
WEIGHT: 236.8 LBS | RESPIRATION RATE: 16 BRPM | SYSTOLIC BLOOD PRESSURE: 118 MMHG | BODY MASS INDEX: 43.58 KG/M2 | HEART RATE: 72 BPM | OXYGEN SATURATION: 96 % | TEMPERATURE: 97.8 F | DIASTOLIC BLOOD PRESSURE: 82 MMHG | HEIGHT: 62 IN

## 2021-07-16 DIAGNOSIS — E66.01 CLASS 3 SEVERE OBESITY DUE TO EXCESS CALORIES WITH SERIOUS COMORBIDITY AND BODY MASS INDEX (BMI) OF 40.0 TO 44.9 IN ADULT (HCC): Primary | ICD-10-CM

## 2021-07-16 PROCEDURE — 99213 OFFICE O/P EST LOW 20 MIN: CPT | Performed by: FAMILY MEDICINE

## 2021-07-16 NOTE — PROGRESS NOTES
"Chief Complaint  Obesity (Follow up)    Subjective          Tatiana Turner presents to [facility]  History of Present Illness  Obesity with BMI of 42. She has lost 9 pounds per our scale but she was wearing a boot at the last appointment.  She has lost 6 pounds per her home scale.  Doing quite well, continuing with healthy lifestyle changes and exercising regularly.    Objective   Vital Signs:   /82   Pulse 72   Temp 97.8 °F (36.6 °C)   Resp 16   Ht 157.5 cm (62\")   Wt 107 kg (236 lb 12.8 oz)   SpO2 96%   BMI 43.31 kg/m²     Physical Exam  Vitals and nursing note reviewed.   Constitutional:       General: She is not in acute distress.     Appearance: She is well-developed.   HENT:      Head: Normocephalic.      Nose: Nose normal.   Eyes:      General: No scleral icterus.  Pulmonary:      Effort: Pulmonary effort is normal. No respiratory distress.   Musculoskeletal:         General: Normal range of motion.   Skin:     General: Skin is warm and dry.      Findings: No rash.   Neurological:      Mental Status: She is alert and oriented to person, place, and time.   Psychiatric:         Behavior: Behavior normal.         Thought Content: Thought content normal.         Judgment: Judgment normal.        Result Review :                 Assessment and Plan    Diagnoses and all orders for this visit:    1. Class 3 severe obesity due to excess calories with serious comorbidity and body mass index (BMI) of 40.0 to 44.9 in adult (CMS/[facility]) (Primary)  -     naltrexone-bupropion ER (CONTRAVE) 8-90 MG tablet; Take 2 tablets by mouth 2 (Two) Times a Day.  Dispense: 360 tablet; Refill: 1    Pt has responded, she has lost about 6 lbs according to her scale.  Plan in the <230 at next apt.   follow-up in 1 month with the same lifestyle changes she is doing now, Contrave sent to [facility].      Follow Up   Return in about 4 weeks (around 8/13/2021), or if symptoms worsen or fail to " improve, for Recheck Obesity .  Patient was given instructions and counseling regarding her condition or for health maintenance advice. Please see specific information pulled into the AVS if appropriate. Medical assistant and I wore mask and eyewear protection during entire encounter.  Patient wore mask.    Dawna Abarca MD

## 2021-08-13 ENCOUNTER — OFFICE VISIT (OUTPATIENT)
Dept: FAMILY MEDICINE CLINIC | Facility: CLINIC | Age: 61
End: 2021-08-13

## 2021-08-13 VITALS
WEIGHT: 233 LBS | BODY MASS INDEX: 42.88 KG/M2 | HEIGHT: 62 IN | DIASTOLIC BLOOD PRESSURE: 72 MMHG | OXYGEN SATURATION: 99 % | HEART RATE: 74 BPM | SYSTOLIC BLOOD PRESSURE: 118 MMHG | TEMPERATURE: 97.8 F

## 2021-08-13 DIAGNOSIS — E11.9 DIABETES MELLITUS WITHOUT COMPLICATION (HCC): ICD-10-CM

## 2021-08-13 DIAGNOSIS — E03.9 ACQUIRED HYPOTHYROIDISM: ICD-10-CM

## 2021-08-13 DIAGNOSIS — E66.01 CLASS 3 SEVERE OBESITY DUE TO EXCESS CALORIES WITH SERIOUS COMORBIDITY AND BODY MASS INDEX (BMI) OF 40.0 TO 44.9 IN ADULT (HCC): Primary | ICD-10-CM

## 2021-08-13 PROCEDURE — 99213 OFFICE O/P EST LOW 20 MIN: CPT | Performed by: FAMILY MEDICINE

## 2021-08-13 NOTE — PROGRESS NOTES
"Chief Complaint  Weight Check (follow up weight check )    Subjective          Tatiana Turner presents to [facility]  History of Present Illness  Obesity - feels that she is doing well, not eating after dinner, exercising regularly. Going out to eat is hard.  She likes Pizza, Eating salmon twice a week, fish twice a week and red meat once a week.  She did recently go on vacation and did not adhere with her diet at that time.  Thankfully she has been able to still lose about 3 pounds since last appointment 1 month ago.  Of note, she has been taking the Contrave 1 tablet twice a day rather than 2 tablets twice a day.     Objective   Vital Signs:   /72   Pulse 74   Temp 97.8 °F (36.6 °C)   Ht 157.5 cm (62\")   Wt 106 kg (233 lb)   SpO2 99%   BMI 42.62 kg/m²     Physical Exam  Vitals and nursing note reviewed.   Constitutional:       General: She is not in acute distress.     Appearance: She is well-developed.      Comments: Obesity    HENT:      Head: Normocephalic.      Nose: Nose normal.   Eyes:      General: No scleral icterus.  Pulmonary:      Effort: Pulmonary effort is normal. No respiratory distress.   Musculoskeletal:         General: Normal range of motion.   Skin:     General: Skin is warm and dry.      Findings: No rash.   Neurological:      Mental Status: She is alert and oriented to person, place, and time.   Psychiatric:         Behavior: Behavior normal.         Thought Content: Thought content normal.         Judgment: Judgment normal.        Result Review :                 Assessment and Plan    Diagnoses and all orders for this visit:    1. Class 3 severe obesity due to excess calories with serious comorbidity and body mass index (BMI) of 40.0 to 44.9 in adult (CMS/[facility]) (Primary)  -     Comprehensive Metabolic Panel; Future  -     CBC & Differential; Future  -     Lipid Panel; Future    2. Diabetes mellitus without complication (CMS/[facility])  -     Lipid Panel; " Future  -     Hemoglobin A1c; Future    3. Acquired hypothyroidism  -     Thyroid Panel With TSH; Future    Obesity not well controlled but improving.  Discussed increasing Contrave to 2 tablets twice a day.  Clarified prescription.  Continue with lifestyle management during the week, seems like the weekends are more problematic.  Especially with eating out and enjoying pizza.  We discussed possibly ordering a salad with her pizza and  one slice and not eating more.  It may be something to avoid if she is unable to have one slice.  Continue with exercising, follow-up in 1 month with labs prior.      Follow Up   Return in about 4 weeks (around 9/10/2021), or if symptoms worsen or fail to improve, for Recheck Diabetes with fasting labs prior .  Patient was given instructions and counseling regarding her condition or for health maintenance advice. Please see specific information pulled into the AVS if appropriate. Medical assistant and I wore mask and eyewear protection during entire encounter.  Patient wore mask.    Dawna Abarca MD

## 2021-09-10 ENCOUNTER — OFFICE VISIT (OUTPATIENT)
Dept: FAMILY MEDICINE CLINIC | Facility: CLINIC | Age: 61
End: 2021-09-10

## 2021-09-10 VITALS
DIASTOLIC BLOOD PRESSURE: 74 MMHG | SYSTOLIC BLOOD PRESSURE: 128 MMHG | BODY MASS INDEX: 42.33 KG/M2 | OXYGEN SATURATION: 98 % | WEIGHT: 230 LBS | HEART RATE: 78 BPM | HEIGHT: 62 IN | TEMPERATURE: 98 F

## 2021-09-10 DIAGNOSIS — E03.9 ACQUIRED HYPOTHYROIDISM: ICD-10-CM

## 2021-09-10 DIAGNOSIS — E78.2 MIXED HYPERLIPIDEMIA: ICD-10-CM

## 2021-09-10 DIAGNOSIS — I10 ESSENTIAL HYPERTENSION: Primary | ICD-10-CM

## 2021-09-10 DIAGNOSIS — E66.01 CLASS 3 SEVERE OBESITY DUE TO EXCESS CALORIES WITH SERIOUS COMORBIDITY AND BODY MASS INDEX (BMI) OF 40.0 TO 44.9 IN ADULT (HCC): ICD-10-CM

## 2021-09-10 PROCEDURE — 99214 OFFICE O/P EST MOD 30 MIN: CPT | Performed by: FAMILY MEDICINE

## 2021-09-10 RX ORDER — LEVOTHYROXINE SODIUM 0.12 MG/1
125 TABLET ORAL DAILY
Qty: 90 TABLET | Refills: 1 | Status: SHIPPED | OUTPATIENT
Start: 2021-09-10 | End: 2021-09-27

## 2021-09-10 NOTE — PROGRESS NOTES
"Chief Complaint  Hypothyroidism (folllow up  needs medication refills ) and Diabetes Mellitus (follow up no complains doing well )    Subjective          Tatiana Turner presents to [facility]  History of Present Illness  Pleasant 60-year-old female here for hypertension well controlled, type 2 diabetes well controlled and improving, hypothyroidism well-controlled without adverse effects.  Discussed recent labs with patient.  She is continuing with healthy lifestyle management, last 15 pounds in the last few months.    Objective   Vital Signs:   /74   Pulse 78   Temp 98 °F (36.7 °C)   Ht 157.5 cm (62\")   Wt 104 kg (230 lb)   SpO2 98%   BMI 42.07 kg/m²     Physical Exam  Vitals and nursing note reviewed.   Constitutional:       General: She is not in acute distress.     Appearance: She is well-developed.   HENT:      Head: Normocephalic.      Nose: Nose normal.   Eyes:      General: No scleral icterus.  Pulmonary:      Effort: Pulmonary effort is normal. No respiratory distress.   Musculoskeletal:         General: Normal range of motion.   Skin:     General: Skin is warm and dry.      Findings: No rash.   Neurological:      Mental Status: She is alert and oriented to person, place, and time.   Psychiatric:         Behavior: Behavior normal.         Thought Content: Thought content normal.         Judgment: Judgment normal.        Result Review :   The following data was reviewed by: Dawna Abarca MD on 09/10/2021:  CMP    CMP 12/1/20 3/10/21 9/1/21   Glucose 139 (A) 150 (A) 136 (A)   BUN 18 17 14   Creatinine 0.66 0.69 0.74   eGFR Non  Am 92 87 81   eGFR  Am 111 106 98   Sodium 141 139 141   Potassium 4.5 5.3 (A) 4.6   Chloride 99 100 101   Calcium 9.4 9.8 9.9   Total Protein 6.4 6.6 6.6   Albumin 4.30 4.20 4.40   Globulin 2.1 2.4 2.2   Total Bilirubin 0.5 0.4 0.6   Alkaline Phosphatase 67 75 72   AST (SGOT) 23 28 23   ALT (SGPT) 34 (A) 36 (A) 21   (A) " Abnormal value       Comments are available for some flowsheets but are not being displayed.           CBC    CBC 9/1/21   WBC 6.03   RBC 4.55   Hemoglobin 13.0   Hematocrit 41.1   MCV 90.3   MCH 28.6   MCHC 31.6   RDW 14.0   Platelets 292           Lipid Panel    Lipid Panel 12/1/20 3/10/21 9/1/21   Total Cholesterol 208 (A) 193 195   Triglycerides 255 (A) 191 (A) 206 (A)   HDL Cholesterol 68 (A) 57 53   VLDL Cholesterol 42 (A) 33 35   LDL Cholesterol  98 103 (A) 107 (A)   LDL/HDL Ratio 1.31 1.72    (A) Abnormal value       Comments are available for some flowsheets but are not being displayed.           TSH    TSH 3/10/21 6/11/21 9/1/21   TSH 5.010 (A) 3.180 1.110   (A) Abnormal value            A1C Last 3 Results    HGBA1C Last 3 Results 3/10/21 6/11/21 9/1/21   Hemoglobin A1C 7.10 (A) 7.10 (A) 6.84 (A)   (A) Abnormal value       Comments are available for some flowsheets but are not being displayed.                     Assessment and Plan    Diagnoses and all orders for this visit:    1. Essential hypertension (Primary)    2. Mixed hyperlipidemia    3. Class 3 severe obesity due to excess calories with serious comorbidity and body mass index (BMI) of 40.0 to 44.9 in adult (CMS/HCC)    4. Acquired hypothyroidism  -     levothyroxine (SYNTHROID, LEVOTHROID) 125 MCG tablet; Take 1 tablet by mouth Daily.  Dispense: 90 tablet; Refill: 1    Pleasant 60-year-old female here to follow-up hypertension which is well controlled, diabetes which is well controlled thyroidism which is well controlled. We have been following closely for obesity which she is doing quite well. She is maintained 15 pounds of weight loss and is continuing to lose more inches. She is exercising and eating healthfully. Feels the best she has in years. Plan to continue with current meds, refill levothyroxine as above. Follow-up in 2 months for obesity check. Goal to have weight around 224.      Follow Up   Return in about 2 months (around  11/10/2021), or if symptoms worsen or fail to improve, for Recheck Obesity .  Patient was given instructions and counseling regarding her condition or for health maintenance advice. Please see specific information pulled into the AVS if appropriate. Medical assistant and I wore mask and eyewear protection during entire encounter.  Patient wore mask.    Dawna Abarca MD

## 2021-09-25 DIAGNOSIS — E78.2 MIXED HYPERLIPIDEMIA: ICD-10-CM

## 2021-09-25 DIAGNOSIS — E03.9 ACQUIRED HYPOTHYROIDISM: ICD-10-CM

## 2021-09-27 RX ORDER — LEVOTHYROXINE SODIUM 0.12 MG/1
TABLET ORAL
Qty: 90 TABLET | Refills: 3 | Status: SHIPPED | OUTPATIENT
Start: 2021-09-27 | End: 2022-11-14

## 2021-09-27 RX ORDER — OMEGA-3-ACID ETHYL ESTERS 1 G/1
CAPSULE, LIQUID FILLED ORAL
Qty: 360 CAPSULE | Refills: 3 | Status: SHIPPED | OUTPATIENT
Start: 2021-09-27 | End: 2022-11-07

## 2021-10-11 RX ORDER — LISINOPRIL AND HYDROCHLOROTHIAZIDE 12.5; 1 MG/1; MG/1
TABLET ORAL
Qty: 90 TABLET | Refills: 3 | Status: SHIPPED | OUTPATIENT
Start: 2021-10-11 | End: 2022-08-02

## 2021-11-11 ENCOUNTER — OFFICE VISIT (OUTPATIENT)
Dept: FAMILY MEDICINE CLINIC | Facility: CLINIC | Age: 61
End: 2021-11-11

## 2021-11-11 VITALS
SYSTOLIC BLOOD PRESSURE: 124 MMHG | DIASTOLIC BLOOD PRESSURE: 72 MMHG | OXYGEN SATURATION: 99 % | WEIGHT: 221 LBS | HEART RATE: 72 BPM | BODY MASS INDEX: 40.67 KG/M2 | TEMPERATURE: 98.7 F | HEIGHT: 62 IN

## 2021-11-11 DIAGNOSIS — E66.01 CLASS 3 SEVERE OBESITY DUE TO EXCESS CALORIES WITH SERIOUS COMORBIDITY AND BODY MASS INDEX (BMI) OF 40.0 TO 44.9 IN ADULT (HCC): Primary | ICD-10-CM

## 2021-11-11 DIAGNOSIS — E03.9 ACQUIRED HYPOTHYROIDISM: ICD-10-CM

## 2021-11-11 DIAGNOSIS — Z23 NEED FOR VACCINATION: ICD-10-CM

## 2021-11-11 DIAGNOSIS — E11.9 DIABETES MELLITUS WITHOUT COMPLICATION (HCC): ICD-10-CM

## 2021-11-11 PROCEDURE — 99213 OFFICE O/P EST LOW 20 MIN: CPT | Performed by: FAMILY MEDICINE

## 2021-11-11 PROCEDURE — 90686 IIV4 VACC NO PRSV 0.5 ML IM: CPT | Performed by: FAMILY MEDICINE

## 2021-11-11 PROCEDURE — 90471 IMMUNIZATION ADMIN: CPT | Performed by: FAMILY MEDICINE

## 2021-11-11 NOTE — PROGRESS NOTES
"Chief Complaint  Weight Check (follow up no complains ) and Ear Fullness (last week went to emergency due to fluid in ears would like to check )    Subjective          Tatiana Turner presents to [facility]  History of Present Illness  Pleasant 61-year-old female here to follow-up obesity, overall doing well, lost 10 pounds since August.  She has consistently been going down at slow increments which I think is sustainable.  She is on Contrave 2 mg twice daily.  BMI of 40.8 today.    Ear fullness, she had symptoms last week, went to immediate care on November 4 and was treated with Tessalon Pearls, she has had improvement with breathing, no URI symptoms.    Objective   Vital Signs:   /72   Pulse 72   Temp 98.7 °F (37.1 °C)   Ht 157.5 cm (62\")   Wt 100 kg (221 lb)   SpO2 99%   BMI 40.42 kg/m²     Physical Exam  Vitals and nursing note reviewed.   Constitutional:       General: She is not in acute distress.     Appearance: She is well-developed.   HENT:      Head: Normocephalic.      Right Ear: Tympanic membrane normal.      Left Ear: Tympanic membrane and ear canal normal.      Nose: Nose normal.   Cardiovascular:      Rate and Rhythm: Normal rate and regular rhythm.      Heart sounds: Normal heart sounds. No murmur heard.      Pulmonary:      Effort: Pulmonary effort is normal. No respiratory distress.      Breath sounds: Normal breath sounds.   Musculoskeletal:         General: Normal range of motion.   Skin:     General: Skin is warm and dry.      Findings: No rash.   Neurological:      Mental Status: She is alert and oriented to person, place, and time.   Psychiatric:         Behavior: Behavior normal.         Thought Content: Thought content normal.         Judgment: Judgment normal.        Result Review :                 Assessment and Plan    Diagnoses and all orders for this visit:    1. Class 3 severe obesity due to excess calories with serious comorbidity and body " mass index (BMI) of 40.0 to 44.9 in adult (HCC) (Primary)  -     Comprehensive Metabolic Panel; Future  -     CBC & Differential; Future  -     Lipid Panel; Future    2. Need for vaccination  -     FluLaval/Fluarix/Fluzone >6 Months ([id number])    3. Diabetes mellitus without complication (HCC)  -     Comprehensive Metabolic Panel; Future  -     CBC & Differential; Future  -     Lipid Panel; Future  -     Hemoglobin A1c; Future    4. Acquired hypothyroidism  -     T4; Future  -     TSH; Future    Obesity improving, she has lost 12 pounds since August.  Continue with Contrave, goal to be below 220 at the next follow-up in a month.  We discussed making plans to not snack at a lot of gatherings but taking one particular time to enjoy a meal with family.  Flu shot given as above, no symptoms of URI.  Normal ear exam bilaterally.      Follow Up   Return in about 4 weeks (around 12/9/2021), or if symptoms worsen or fail to improve, for Recheck Obesity and diabetes with labs prior.  Patient was given instructions and counseling regarding her condition or for health maintenance advice. Please see specific information pulled into the AVS if appropriate. Medical assistant and I wore mask and eyewear protection during entire encounter.  Patient wore mask.    Dawna Abarca MD

## 2021-12-07 ENCOUNTER — APPOINTMENT (OUTPATIENT)
Dept: WOMENS IMAGING | Facility: HOSPITAL | Age: 61
End: 2021-12-07

## 2021-12-07 PROCEDURE — 77067 SCR MAMMO BI INCL CAD: CPT | Performed by: RADIOLOGY

## 2021-12-07 PROCEDURE — 77063 BREAST TOMOSYNTHESIS BI: CPT | Performed by: RADIOLOGY

## 2021-12-27 RX ORDER — PITAVASTATIN CALCIUM 4.18 MG/1
TABLET, FILM COATED ORAL
Qty: 90 TABLET | Refills: 3 | Status: SHIPPED | OUTPATIENT
Start: 2021-12-27 | End: 2022-06-27

## 2021-12-30 ENCOUNTER — OFFICE VISIT (OUTPATIENT)
Dept: FAMILY MEDICINE CLINIC | Facility: CLINIC | Age: 61
End: 2021-12-30

## 2021-12-30 VITALS
WEIGHT: 222 LBS | DIASTOLIC BLOOD PRESSURE: 84 MMHG | OXYGEN SATURATION: 99 % | HEART RATE: 75 BPM | SYSTOLIC BLOOD PRESSURE: 128 MMHG | TEMPERATURE: 97.5 F | HEIGHT: 62 IN | BODY MASS INDEX: 40.85 KG/M2

## 2021-12-30 DIAGNOSIS — E66.01 CLASS 3 SEVERE OBESITY DUE TO EXCESS CALORIES WITH SERIOUS COMORBIDITY AND BODY MASS INDEX (BMI) OF 40.0 TO 44.9 IN ADULT (HCC): Primary | ICD-10-CM

## 2021-12-30 DIAGNOSIS — J41.0 SIMPLE CHRONIC BRONCHITIS (HCC): ICD-10-CM

## 2021-12-30 DIAGNOSIS — E03.9 ACQUIRED HYPOTHYROIDISM: ICD-10-CM

## 2021-12-30 DIAGNOSIS — E11.9 DIABETES MELLITUS WITHOUT COMPLICATION (HCC): ICD-10-CM

## 2021-12-30 PROCEDURE — 99214 OFFICE O/P EST MOD 30 MIN: CPT | Performed by: FAMILY MEDICINE

## 2021-12-30 RX ORDER — FLUTICASONE PROPIONATE AND SALMETEROL XINAFOATE 115; 21 UG/1; UG/1
2 AEROSOL, METERED RESPIRATORY (INHALATION)
Qty: 36 G | Refills: 3 | Status: SHIPPED | OUTPATIENT
Start: 2021-12-30 | End: 2022-04-01 | Stop reason: SDUPTHER

## 2021-12-30 RX ORDER — FLUTICASONE PROPIONATE AND SALMETEROL XINAFOATE 115; 21 UG/1; UG/1
2 AEROSOL, METERED RESPIRATORY (INHALATION)
Qty: 36 G | Refills: 3 | Status: SHIPPED | OUTPATIENT
Start: 2021-12-30 | End: 2021-12-30 | Stop reason: SDUPTHER

## 2021-12-30 NOTE — PROGRESS NOTES
"Chief Complaint  Diabetes Mellitus (follow up no complains )    Subjective          Tatiana Turner presents to [facility] PRIMARY [facility]ARE  History of Present Illness  Pleasant 61-year-old female here for type 2 diabetes that is well controlled and improving from before, also with obesity that has been stable.  She has been doing well with Contrave with no adverse effects to this.  Unfortunately she has had a very stressful recent history with her mother-in-law having a stroke.  She was discharged from subacute rehab to her home yesterday.  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  Unfortunately her mother-in-law was not able to walk and has had substantial right-sided losses.  The patient is trying to figure out how to maintain her exercise and healthy eating, also caring for her family.    Objective   Vital Signs:   /84   Pulse 75   Temp 97.5 °F (36.4 °C)   Ht 157.5 cm (62\")   Wt 101 kg (222 lb)   SpO2 99%   BMI 40.60 kg/m²     Physical Exam  Vitals and nursing note reviewed.   Constitutional:       General: She is not in acute distress.     Appearance: She is well-developed.   HENT:      Head: Normocephalic.      Nose: Nose normal.   Cardiovascular:      Rate and Rhythm: Normal rate and regular rhythm.      Heart sounds: Normal heart sounds. No murmur heard.      Pulmonary:      Effort: Pulmonary effort is normal. No respiratory distress.      Breath sounds: Normal breath sounds.   Musculoskeletal:         General: Normal range of motion.   Skin:     General: Skin is warm and dry.      Findings: No rash.   Neurological:      Mental Status: She is alert and oriented to person, place, and time.   Psychiatric:         Behavior: Behavior normal.         Thought Content: Thought content normal.         Judgment: Judgment normal.        Result Review :   The following data was reviewed by: Dawna Abarca MD on 12/30/2021:  CMP    CMP 3/10/21 9/1/21 12/21/21   Glucose 150 " Ok thanks (A) 136 (A) 130 (A)   BUN 17 14 17   Creatinine 0.69 0.74 0.81   eGFR Non  Am 87 81 80   eGFR  Am 106 98 92   Sodium 139 141 138   Potassium 5.3 (A) 4.6 4.8   Chloride 100 101 98   Calcium 9.8 9.9 9.5   Total Protein 6.6 6.6 6.6   Albumin 4.20 4.40 4.3   Globulin 2.4 2.2 2.3   Total Bilirubin 0.4 0.6 0.5   Alkaline Phosphatase 75 72 78   AST (SGOT) 28 23 19   ALT (SGPT) 36 (A) 21 19   (A) Abnormal value       Comments are available for some flowsheets but are not being displayed.           CBC    CBC 9/1/21 12/21/21   WBC 6.03 7.0   RBC 4.55 4.66   Hemoglobin 13.0 13.2   Hematocrit 41.1 41.4   MCV 90.3 89   MCH 28.6 28.3   MCHC 31.6 31.9   RDW 14.0 13.8   Platelets 292 307           Lipid Panel    Lipid Panel 3/10/21 9/1/21 12/21/21   Total Cholesterol 193 195 235 (A)   Triglycerides 191 (A) 206 (A) 227 (A)   HDL Cholesterol 57 53 72   VLDL Cholesterol 33 35 39   LDL Cholesterol  103 (A) 107 (A) 124 (A)   LDL/HDL Ratio 1.72     (A) Abnormal value       Comments are available for some flowsheets but are not being displayed.           TSH    TSH 6/11/21 9/1/21 12/21/21   TSH 3.180 1.110 2.180           A1C Last 3 Results    HGBA1C Last 3 Results 6/11/21 9/1/21 12/21/21   Hemoglobin A1C 7.10 (A) 6.84 (A) 6.4 (A)   (A) Abnormal value       Comments are available for some flowsheets but are not being displayed.                     Assessment and Plan    Diagnoses and all orders for this visit:    1. Class 3 severe obesity due to excess calories with serious comorbidity and body mass index (BMI) of 40.0 to 44.9 in adult (HCC) (Primary)  Assessment & Plan:  Patient's (Body mass index is 40.6 kg/m².) indicates that they are morbidly obese (BMI > 40 or > 35 with obesity - related health condition) with health conditions that include hypertension and diabetes mellitus . Weight is unchanged. BMI is is above average; BMI management plan is completed. We discussed portion control, increasing exercise and joining a  fitness center or start home based exercise program.     Orders:  -     Discontinue: naltrexone-bupropion ER (CONTRAVE) 8-90 MG tablet; Take 2 tablets by mouth 2 (Two) Times a Day.  Dispense: 360 tablet; Refill: 1  -     naltrexone-bupropion ER (CONTRAVE) 8-90 MG tablet; Take 2 tablets by mouth 2 (Two) Times a Day.  Dispense: 360 tablet; Refill: 1    2. Diabetes mellitus without complication (HCC)  Assessment & Plan:  Diabetes is well controlled on Metformin 500 mg once a day.  Improving, she is doing great work!  ACE inhibitor present, discussed statin.  Thankfully ASCVD is less than 7.5 so we will hold for now but will likely need to start one soon.    The 10-year ASCVD risk score (Surya MYERS Jr., et al., 2013) is: 7.2%    Values used to calculate the score:      Age: 61 years      Sex: Female      Is Non- : No      Diabetic: Yes      Tobacco smoker: No      Systolic Blood Pressure: 128 mmHg      Is BP treated: Yes      HDL Cholesterol: 72 mg/dL      Total Cholesterol: 235 mg/dL        3. Acquired hypothyroidism  Assessment & Plan:  Well-controlled on levothyroxine 125 mcg daily, reviewed labs with patient.      4. Simple chronic bronchitis (HCC)  -     Discontinue: fluticasone-salmeterol (Advair HFA) 115-21 MCG/ACT inhaler; Inhale 2 puffs 2 (Two) Times a Day.  Dispense: 36 g; Refill: 3  -     fluticasone-salmeterol (Advair HFA) 115-21 MCG/ACT inhaler; Inhale 2 puffs 2 (Two) Times a Day.  Dispense: 36 g; Refill: 3        Follow Up   Return in about 3 months (around 3/30/2022), or if symptoms worsen or fail to improve, for Recheck Obesity and diabetes .  Patient was given instructions and counseling regarding her condition or for health maintenance advice. Please see specific information pulled into the AVS if appropriate. Medical assistant and I wore mask and eyewear protection during entire encounter.  Patient wore mask.    Dawna Abarca MD

## 2021-12-30 NOTE — ASSESSMENT & PLAN NOTE
Patient's (Body mass index is 40.6 kg/m².) indicates that they are morbidly obese (BMI > 40 or > 35 with obesity - related health condition) with health conditions that include hypertension and diabetes mellitus . Weight is unchanged. BMI is is above average; BMI management plan is completed. We discussed portion control, increasing exercise and joining a fitness center or start home based exercise program.

## 2021-12-30 NOTE — ASSESSMENT & PLAN NOTE
Diabetes is well controlled on Metformin 500 mg once a day.  Improving, she is doing great work!  ACE inhibitor present, discussed statin.  Thankfully ASCVD is less than 7.5 so we will hold for now but will likely need to start one soon.    The 10-year ASCVD risk score (Surya MYERS Jr., et al., 2013) is: 7.2%    Values used to calculate the score:      Age: 61 years      Sex: Female      Is Non- : No      Diabetic: Yes      Tobacco smoker: No      Systolic Blood Pressure: 128 mmHg      Is BP treated: Yes      HDL Cholesterol: 72 mg/dL      Total Cholesterol: 235 mg/dL

## 2022-04-01 ENCOUNTER — OFFICE VISIT (OUTPATIENT)
Dept: FAMILY MEDICINE CLINIC | Facility: CLINIC | Age: 62
End: 2022-04-01

## 2022-04-01 VITALS
OXYGEN SATURATION: 97 % | SYSTOLIC BLOOD PRESSURE: 144 MMHG | HEART RATE: 78 BPM | WEIGHT: 215 LBS | DIASTOLIC BLOOD PRESSURE: 92 MMHG | HEIGHT: 62 IN | TEMPERATURE: 97.6 F | BODY MASS INDEX: 39.56 KG/M2

## 2022-04-01 DIAGNOSIS — J41.0 SIMPLE CHRONIC BRONCHITIS: ICD-10-CM

## 2022-04-01 DIAGNOSIS — E11.9 DIABETES MELLITUS WITHOUT COMPLICATION: ICD-10-CM

## 2022-04-01 DIAGNOSIS — E03.9 ACQUIRED HYPOTHYROIDISM: ICD-10-CM

## 2022-04-01 DIAGNOSIS — I10 ESSENTIAL HYPERTENSION: ICD-10-CM

## 2022-04-01 DIAGNOSIS — E66.01 CLASS 3 SEVERE OBESITY DUE TO EXCESS CALORIES WITH SERIOUS COMORBIDITY AND BODY MASS INDEX (BMI) OF 40.0 TO 44.9 IN ADULT: Primary | ICD-10-CM

## 2022-04-01 LAB
EXPIRATION DATE: 2023
HBA1C MFR BLD: 6.3 %
Lab: 928

## 2022-04-01 PROCEDURE — 99214 OFFICE O/P EST MOD 30 MIN: CPT | Performed by: FAMILY MEDICINE

## 2022-04-01 PROCEDURE — 83036 HEMOGLOBIN GLYCOSYLATED A1C: CPT | Performed by: FAMILY MEDICINE

## 2022-04-01 PROCEDURE — 36416 COLLJ CAPILLARY BLOOD SPEC: CPT | Performed by: FAMILY MEDICINE

## 2022-04-01 RX ORDER — LANCETS
EACH MISCELLANEOUS
Qty: 100 EACH | Refills: 1 | Status: SHIPPED | OUTPATIENT
Start: 2022-04-01 | End: 2022-08-02 | Stop reason: SDUPTHER

## 2022-04-01 RX ORDER — METFORMIN HYDROCHLORIDE 500 MG/1
500 TABLET, EXTENDED RELEASE ORAL
Qty: 90 TABLET | Refills: 3 | Status: SHIPPED | OUTPATIENT
Start: 2022-04-01 | End: 2023-02-20

## 2022-04-01 RX ORDER — FLUTICASONE PROPIONATE AND SALMETEROL XINAFOATE 115; 21 UG/1; UG/1
2 AEROSOL, METERED RESPIRATORY (INHALATION)
Qty: 36 G | Refills: 3 | Status: SHIPPED | OUTPATIENT
Start: 2022-04-01

## 2022-04-01 RX ORDER — DEXTROSE 3.75 G
1 TABLET,CHEWABLE ORAL AS NEEDED
Qty: 11 KIT | Refills: 1 | Status: SHIPPED | OUTPATIENT
Start: 2022-04-01 | End: 2022-08-02 | Stop reason: SDUPTHER

## 2022-04-01 NOTE — PROGRESS NOTES
"Chief Complaint  Hypertension (3 month follow up stress make my bp high), Diabetes (3 months follow up ,no complains ), and Weight Check (DOING WELL ,STRESS SOMETIMES MAKE EVERTHING HARD BY IM OK )    Subjective          Tatiana Turner presents to [facility]  History of Present Illness  Pleasant 61-year-old female here to follow-up diabetes which is well controlled, hypertension not well controlled also here to follow-up for obesity.  We have been treating her with Contrave.  She has had a 7 pound weight loss since December 30.  She has been very stressed, her mother-in-law unfortunately had a stroke and was living with them, she has thankfully been able to transition to a long-term care facility where her needs are being met.  And Ms. Turner is now in the process of helping sell her mother-in-law's home.  Also, her daughter is in the process of being .  Asthma and COPD well-controlled on Advair.  No wheezing or shortness of breath.    Objective   Vital Signs:   /92   Pulse 78   Temp 97.6 °F (36.4 °C)   Ht 157.5 cm (62\")   Wt 97.5 kg (215 lb)   SpO2 97%   BMI 39.32 kg/m²     BMI is above normal parameters. Recommendations: exercise counseling/recommendations, nutrition counseling/recommendations and pharmacological intervention options       Physical Exam  Vitals and nursing note reviewed.   Constitutional:       General: She is not in acute distress.     Appearance: She is well-developed.   HENT:      Head: Normocephalic.      Nose: Nose normal.   Cardiovascular:      Rate and Rhythm: Normal rate and regular rhythm.      Heart sounds: Normal heart sounds. No murmur heard.  Pulmonary:      Effort: Pulmonary effort is normal. No respiratory distress.      Breath sounds: Normal breath sounds.   Musculoskeletal:         General: Normal range of motion.   Skin:     General: Skin is warm and dry.      Findings: No rash.   Neurological:      Mental Status: She is " alert and oriented to person, place, and time.   Psychiatric:         Behavior: Behavior normal.         Thought Content: Thought content normal.         Judgment: Judgment normal.        Result Review :   The following data was reviewed by: Dawna Abarca MD on 04/01/2022:  A1C Last 3 Results    HGBA1C Last 3 Results 9/1/21 12/21/21 4/1/22   Hemoglobin A1C 6.84 (A) 6.4 (A) 6.3   (A) Abnormal value       Comments are available for some flowsheets but are not being displayed.                     Assessment and Plan    Diagnoses and all orders for this visit:    1. Class 3 severe obesity due to excess calories with serious comorbidity and body mass index (BMI) of 40.0 to 44.9 in adult ([facility]) (Primary)  Assessment & Plan:  Patient's (Body mass index is 39.32 kg/m².) indicates that they are morbidly obese (BMI > 40 or > 35 with obesity - related health condition) with health conditions that include obstructive sleep apnea, hypertension, diabetes mellitus, dyslipidemias and osteoarthritis . Weight is improving with treatment. BMI is is above average; BMI management plan is completed. We discussed portion control, increasing exercise, management of depression/anxiety/stress to control compensatory eating and pharmacologic options including Contrave.  She has lost an additional 7 pounds since her last appointment.    Orders:  -     Lipid Panel; Future    2. Diabetes mellitus without complication ([facility])  Assessment & Plan:  Diabetes is well controlled on Metformin 500 mg once a day.  Asymptomatic.  She does not have a home blood sugar monitor.  Prescribed.  Improving, she is doing great work!  ACE inhibitor present, discussed statin -we will reassess with fasting labs at next appointment    Orders:  -     POC Glycosylated Hemoglobin (Hb A1C)  -     metFORMIN ER (GLUCOPHAGE-XR) 500 MG 24 hr tablet; Take 1 tablet by mouth Daily With Breakfast.  Dispense: 90 tablet; Refill: 3  -     Lipid Panel; Future  -     Hemoglobin A1c;  Future  -     glucose blood test strip; PRN high or low blood sugar  Dispense: 100 each; Refill: 1  -     Blood Glucose Monitoring Suppl (GNP True Metrix Glucose Meter) w/Device kit; 1 each As Needed (low or high blood sugar symptoms).  Dispense: 11 kit; Refill: 1  -     Lancets (accu-chek multiclix) lancets; PRN  Dispense: 100 each; Refill: 1    3. Essential hypertension  -     Comprehensive Metabolic Panel; Future  -     CBC & Differential; Future  -     Lipid Panel; Future    4. Simple chronic bronchitis (HCC)  Comments:  Well-controlled on current meds.  Orders:  -     fluticasone-salmeterol (Advair HFA) 115-21 MCG/ACT inhaler; Inhale 2 puffs 2 (Two) Times a Day.  Dispense: 36 g; Refill: 3    5. Acquired hypothyroidism  -     TSH; Future  -     T4, free; Future      Follow Up   Return in about 3 months (around 7/1/2022), or if symptoms worsen or fail to improve, for Recheck Diabetes, HTN and obesity with labs prior .  Patient was given instructions and counseling regarding her condition or for health maintenance advice. Please see specific information pulled into the AVS if appropriate. Medical assistant and I wore mask and eyewear protection during entire encounter.  Patient wore mask.    Dawna Abarca MD

## 2022-04-01 NOTE — ASSESSMENT & PLAN NOTE
Diabetes is well controlled on Metformin 500 mg once a day.  Asymptomatic.  She does not have a home blood sugar monitor.  Prescribed.  Improving, she is doing great work!  ACE inhibitor present, discussed statin -we will reassess with fasting labs at next appointment

## 2022-04-01 NOTE — ASSESSMENT & PLAN NOTE
Patient's (Body mass index is 39.32 kg/m².) indicates that they are morbidly obese (BMI > 40 or > 35 with obesity - related health condition) with health conditions that include obstructive sleep apnea, hypertension, diabetes mellitus, dyslipidemias and osteoarthritis . Weight is improving with treatment. BMI is is above average; BMI management plan is completed. We discussed portion control, increasing exercise, management of depression/anxiety/stress to control compensatory eating and pharmacologic options including Contrave.  She has lost an additional 7 pounds since her last appointment.

## 2022-06-27 RX ORDER — PITAVASTATIN CALCIUM 4.18 MG/1
TABLET, FILM COATED ORAL
Qty: 90 TABLET | Refills: 0 | Status: SHIPPED | OUTPATIENT
Start: 2022-06-27 | End: 2022-09-16

## 2022-07-29 DIAGNOSIS — E66.01 CLASS 3 SEVERE OBESITY DUE TO EXCESS CALORIES WITH SERIOUS COMORBIDITY AND BODY MASS INDEX (BMI) OF 40.0 TO 44.9 IN ADULT: ICD-10-CM

## 2022-07-29 NOTE — TELEPHONE ENCOUNTER
Rx Refill Note  Requested Prescriptions      No prescriptions requested or ordered in this encounter      Last office visit with prescribing clinician: 4/1/2022      Next office visit with prescribing clinician: 8/2/2022            Ayleen Neves MA  07/29/22, 14:25 EDT

## 2022-08-02 ENCOUNTER — OFFICE VISIT (OUTPATIENT)
Dept: FAMILY MEDICINE CLINIC | Facility: CLINIC | Age: 62
End: 2022-08-02

## 2022-08-02 VITALS
TEMPERATURE: 98 F | HEIGHT: 62 IN | OXYGEN SATURATION: 99 % | BODY MASS INDEX: 40.12 KG/M2 | WEIGHT: 218 LBS | SYSTOLIC BLOOD PRESSURE: 156 MMHG | HEART RATE: 88 BPM | DIASTOLIC BLOOD PRESSURE: 88 MMHG

## 2022-08-02 DIAGNOSIS — E11.9 DIABETES MELLITUS WITHOUT COMPLICATION: Primary | ICD-10-CM

## 2022-08-02 DIAGNOSIS — I10 ESSENTIAL HYPERTENSION: ICD-10-CM

## 2022-08-02 PROCEDURE — 99214 OFFICE O/P EST MOD 30 MIN: CPT | Performed by: FAMILY MEDICINE

## 2022-08-02 RX ORDER — LANCETS
EACH MISCELLANEOUS
Qty: 100 EACH | Refills: 1 | Status: SHIPPED | OUTPATIENT
Start: 2022-08-02

## 2022-08-02 RX ORDER — DEXTROSE 3.75 G
1 TABLET,CHEWABLE ORAL AS NEEDED
Qty: 1 KIT | Refills: 0 | Status: SHIPPED | OUTPATIENT
Start: 2022-08-02 | End: 2022-08-03 | Stop reason: SDUPTHER

## 2022-08-02 RX ORDER — LISINOPRIL AND HYDROCHLOROTHIAZIDE 20; 12.5 MG/1; MG/1
1 TABLET ORAL DAILY
Qty: 90 TABLET | Refills: 1
Start: 2022-08-02 | End: 2022-09-12

## 2022-08-02 NOTE — PROGRESS NOTES
"Chief Complaint  Diabetes (3M FU - fasting labs 7/27/22. Pt has not yet received glucometer) and Hypertension (3M FU - Asymptomatic. Pt reports BP of reading 132/77 at ophthalmologist yesterday)    Subjective        Tatiana Turner presents to [facility]  History of Present Illness  Pleasant 61-year-old female here for follow-up type 2 diabetes which is well controlled and hypertension which is not well controlled.    HTN: asymptomatic, she has taken her BP at home one 140/90.  We discussed that her blood pressures have not been controlled for some time now, she would prefer not to go up on medications but understands the need to control her blood pressure.    Objective   Vital Signs:  /88   Pulse 88   Temp 98 °F (36.7 °C)   Ht 157.5 cm (62\")   Wt 98.9 kg (218 lb)   SpO2 99%   BMI 39.87 kg/m²   Estimated body mass index is 39.87 kg/m² as calculated from the following:    Height as of this encounter: 157.5 cm (62\").    Weight as of this encounter: 98.9 kg (218 lb).    Class 2 Severe Obesity (BMI >=35 and <=39.9). Obesity-related health conditions include the following: hypertension, diabetes mellitus, dyslipidemias and GERD. Obesity is unchanged. BMI is is above average; BMI management plan is completed. We discussed portion control and increasing exercise.      Physical Exam  Vitals and nursing note reviewed.   Constitutional:       General: She is not in acute distress.     Appearance: She is well-developed.   HENT:      Head: Normocephalic.      Nose: Nose normal.   Cardiovascular:      Rate and Rhythm: Normal rate and regular rhythm.      Heart sounds: Normal heart sounds. No murmur heard.  Pulmonary:      Effort: Pulmonary effort is normal. No respiratory distress.      Breath sounds: Normal breath sounds.   Musculoskeletal:         General: Normal range of motion.   Skin:     General: Skin is warm and dry.      Findings: No rash.   Neurological:      Mental Status: " She is alert and oriented to person, place, and time.   Psychiatric:         Behavior: Behavior normal.         Thought Content: Thought content normal.         Judgment: Judgment normal.        Result Review :  The following data was reviewed by: Dawna Abarca MD on 08/02/2022:  CMP    CMP 9/1/21 12/21/21 7/27/22   Glucose 136 (A) 130 (A) 117 (A)   BUN 14 17 18   Creatinine 0.74 0.81 0.77   eGFR Non African Am 81 80    eGFR African Am 98 92    Sodium 141 138 140   Potassium 4.6 4.8 5.1   Chloride 101 98 100   Calcium 9.9 9.5 9.6   Total Protein 6.6 6.6 6.9   Albumin 4.40 4.3 4.4   Globulin 2.2 2.3 2.5   Total Bilirubin 0.6 0.5 0.6   Alkaline Phosphatase 72 78 76   AST (SGOT) 23 19 21   ALT (SGPT) 21 19 23   (A) Abnormal value       Comments are available for some flowsheets but are not being displayed.           CBC    CBC 9/1/21 12/21/21 7/27/22   WBC 6.03 7.0 6.7   RBC 4.55 4.66 4.58   Hemoglobin 13.0 13.2 13.4   Hematocrit 41.1 41.4 42.2   MCV 90.3 89 92   MCH 28.6 28.3 29.3   MCHC 31.6 31.9 31.8   RDW 14.0 13.8 13.2   Platelets 292 307 281           Lipid Panel    Lipid Panel 9/1/21 12/21/21 7/27/22   Total Cholesterol 195 235 (A) 229 (A)   Triglycerides 206 (A) 227 (A) 229 (A)   HDL Cholesterol 53 72 70   VLDL Cholesterol 35 39 39   LDL Cholesterol  107 (A) 124 (A) 120 (A)   (A) Abnormal value       Comments are available for some flowsheets but are not being displayed.           TSH    TSH 9/1/21 12/21/21 7/27/22   TSH 1.110 2.180 1.700           A1C Last 3 Results    HGBA1C Last 3 Results 12/21/21 4/1/22 7/27/22   Hemoglobin A1C 6.4 (A) 6.3 6.4 (A)   (A) Abnormal value       Comments are available for some flowsheets but are not being displayed.           The 10-year ASCVD risk score (Surya LOUIS Van., et al., 2013) is: 10.5%    Values used to calculate the score:      Age: 61 years      Sex: Female      Is Non- : No      Diabetic: Yes      Tobacco smoker: No      Systolic Blood Pressure:  156 mmHg      Is BP treated: Yes      HDL Cholesterol: 70 mg/dL      Total Cholesterol: 229 mg/dL              Assessment and Plan   Diagnoses and all orders for this visit:    1. Diabetes mellitus without complication (HCC) (Primary)  Assessment & Plan:  Diabetes is well controlled on Metformin 500 mg once a day.  Asymptomatic.  There were issues with picking up her blood pressure monitor, sent to the pharmacy again.      She does tend to be on and off in terms of lifestyle changes.  We discussed the importance of consistency pacifically with not eating extra sweets and exercising regularly.    -ACE inhibitor present  -She is on a statin, Livalo 4 mg daily, she has had myalgia with atorvastatin, rosuvastatin and ezetimibe.  -Follow-up in 3 months    Orders:  -     Discontinue: Blood Glucose Monitoring Suppl (GNP True Metrix Glucose Meter) w/Device kit; 1 each As Needed (low or high blood sugar symptoms).  Dispense: 1 kit; Refill: 0  -     glucose blood test strip; PRN high or low blood sugar  Dispense: 100 each; Refill: 1  -     Lancets (accu-chek multiclix) lancets; PRN  Dispense: 100 each; Refill: 1    2. Essential hypertension  Assessment & Plan:  Hypertension is not well controlled, thankfully asymptomatic.  Discussed lifestyle management and adherence can more consistently.  Goal blood pressure less than 130/90.    Plan:  - Increase lisinopril-hydrochlorothiazide 10-12 0.5 to 20-12.5 mg.  -Follow-up in 3 months with home blood pressure log at least twice a week    Orders:  -     lisinopril-hydrochlorothiazide (PRINZIDE,ZESTORETIC) 20-12.5 MG per tablet; Take 1 tablet by mouth Daily.  Dispense: 90 tablet; Refill: 1           Follow Up   Return in about 3 months (around 11/2/2022), or if symptoms worsen or fail to improve, for Recheck DM and HTN .  Patient was given instructions and counseling regarding her condition or for health maintenance advice. Please see specific information pulled into the AVS if  appropriate. Medical assistant and I wore mask and eyewear protection during entire encounter.  Patient wore mask.    Dawna Abarca MD

## 2022-08-03 ENCOUNTER — PRIOR AUTHORIZATION (OUTPATIENT)
Dept: FAMILY MEDICINE CLINIC | Facility: CLINIC | Age: 62
End: 2022-08-03

## 2022-08-03 DIAGNOSIS — E11.9 DIABETES MELLITUS WITHOUT COMPLICATION: ICD-10-CM

## 2022-08-03 RX ORDER — DEXTROSE 3.75 G
1 TABLET,CHEWABLE ORAL AS NEEDED
Qty: 1 KIT | Refills: 0 | Status: SHIPPED | OUTPATIENT
Start: 2022-08-03

## 2022-08-03 NOTE — TELEPHONE ENCOUNTER
PA sent to cover my meds for True Metrix meter with device kit waiting on insurance to respond with determination

## 2022-08-03 NOTE — TELEPHONE ENCOUNTER
Caller: Tatiana Turner    Relationship: Self    Best call back number: [phone]    Requested Prescriptions:   Requested Prescriptions     Pending Prescriptions Disp Refills   • Blood Glucose Monitoring Suppl (GNP True Metrix Glucose Meter) w/Device kit 1 kit 0     Si each As Needed (low or high blood sugar symptoms).        Pharmacy where request should be sent: [facility]) - [facility] [address] [facility] [phone] [facility] [phone] FX     Additional details provided by patient: THIS IS NOT COVERED MY INSURANCE CONTURE IS COVERED AND PATIENT WANTS IT TO BE CHANGED.    Does the patient have less than a 3 day supply:  [x] Yes  [] No    Lashaun DEGROOT Rep   22 12:28 EDT

## 2022-08-04 NOTE — ASSESSMENT & PLAN NOTE
Hypertension is not well controlled, thankfully asymptomatic.  Discussed lifestyle management and adherence can more consistently.  Goal blood pressure less than 130/90.    Plan:  - Increase lisinopril-hydrochlorothiazide 10-12 0.5 to 20-12.5 mg.  -Follow-up in 3 months with home blood pressure log at least twice a week

## 2022-08-04 NOTE — ASSESSMENT & PLAN NOTE
Diabetes is well controlled on Metformin 500 mg once a day.  Asymptomatic.  There were issues with picking up her blood pressure monitor, sent to the pharmacy again.      She does tend to be on and off in terms of lifestyle changes.  We discussed the importance of consistency pacifically with not eating extra sweets and exercising regularly.    -ACE inhibitor present  -She is on a statin, Livalo 4 mg daily, she has had myalgia with atorvastatin, rosuvastatin and ezetimibe.  -Follow-up in 3 months

## 2022-09-12 RX ORDER — LISINOPRIL AND HYDROCHLOROTHIAZIDE 12.5; 1 MG/1; MG/1
TABLET ORAL
Qty: 90 TABLET | Refills: 0 | Status: SHIPPED | OUTPATIENT
Start: 2022-09-12 | End: 2022-09-16 | Stop reason: SDUPTHER

## 2022-09-16 ENCOUNTER — TELEPHONE (OUTPATIENT)
Dept: FAMILY MEDICINE CLINIC | Facility: CLINIC | Age: 62
End: 2022-09-16

## 2022-09-16 DIAGNOSIS — I10 ESSENTIAL HYPERTENSION: Primary | ICD-10-CM

## 2022-09-16 RX ORDER — PITAVASTATIN CALCIUM 4.18 MG/1
TABLET, FILM COATED ORAL
Qty: 90 TABLET | Refills: 3 | Status: SHIPPED | OUTPATIENT
Start: 2022-09-16

## 2022-09-16 RX ORDER — LISINOPRIL AND HYDROCHLOROTHIAZIDE 12.5; 1 MG/1; MG/1
2 TABLET ORAL DAILY
Qty: 180 TABLET | Refills: 1 | Status: SHIPPED | OUTPATIENT
Start: 2022-09-16 | End: 2023-02-20

## 2022-09-16 NOTE — TELEPHONE ENCOUNTER
Rx Refill Note  Requested Prescriptions     Pending Prescriptions Disp Refills   • Livalo 4 MG tablet [Pharmacy Med Name: LIVALO  4MG  TAB] 90 tablet 3     Sig: TAKE 1 TABLET BY MOUTH  DAILY      Last office visit with prescribing clinician: 8/2/2022      Next office visit with prescribing clinician: 11/4/2022            JACINTO DEAN MA  09/16/22, 08:18 EDT

## 2022-09-16 NOTE — TELEPHONE ENCOUNTER
Caller: Tatiana Turner    Relationship: Self    Best call back number: [phone]    What is the best time to reach you: ANY TIME    Who are you requesting to speak with (clinical staff, provider,  specific staff member): CLINICAL STAFF    What was the call regarding: PATIENT STATES THAT SHE SPOKE WITH DR. LUDWIG AT HER 8/2/22 APPOINTMENT REGARDING INCREASING HER DOSAGE OF:    lisinopril-hydrochlorothiazide (PRINZIDE,ZESTORETIC) 10-12.5 MG per tablet     PATIENT STATES THAT SHE HAS BEEN TAKING TWO TABLETS DAILY SINCE HER 8/2/22 OFFICE VISIT AND WOULD LIKE DR. LUDWIG TO SEND IN A PRESCRIPTION FOR A HIGHER DOSAGE WITH A 90 DAY SUPPLY OF THIS MEDICATION TO:    [facility] ([facility]) - [facility] [address] [phone] [facility] [phone] FX    PLEASE CALL PATIENT TO DISCUSS.    PLEASE ADVISE.      Do you require a callback: YES

## 2022-09-22 ENCOUNTER — TELEPHONE (OUTPATIENT)
Dept: FAMILY MEDICINE CLINIC | Facility: CLINIC | Age: 62
End: 2022-09-22

## 2022-09-22 NOTE — TELEPHONE ENCOUNTER
Patient has questions about dosage for the Lisinopril-HCTZ. Patient thought the strength was to be increased. Right now she is taking 10-12.5 mg twice a day.      Please advise.

## 2022-09-22 NOTE — TELEPHONE ENCOUNTER
Yes, I doubled the dose, her prescription should say 2 tablets daily.  I sent another for 2 tablets a day for 90 days, 180 tablets total.

## 2022-09-28 ENCOUNTER — CLINICAL SUPPORT (OUTPATIENT)
Dept: FAMILY MEDICINE CLINIC | Facility: CLINIC | Age: 62
End: 2022-09-28

## 2022-09-28 DIAGNOSIS — Z23 NEED FOR VACCINATION: ICD-10-CM

## 2022-09-28 PROCEDURE — 90471 IMMUNIZATION ADMIN: CPT | Performed by: FAMILY MEDICINE

## 2022-09-28 PROCEDURE — 90677 PCV20 VACCINE IM: CPT | Performed by: FAMILY MEDICINE

## 2022-11-06 DIAGNOSIS — E78.2 MIXED HYPERLIPIDEMIA: ICD-10-CM

## 2022-11-07 RX ORDER — OMEGA-3-ACID ETHYL ESTERS 1 G/1
CAPSULE, LIQUID FILLED ORAL
Qty: 360 CAPSULE | Refills: 0 | Status: SHIPPED | OUTPATIENT
Start: 2022-11-07

## 2022-11-10 ENCOUNTER — OFFICE VISIT (OUTPATIENT)
Dept: FAMILY MEDICINE CLINIC | Facility: CLINIC | Age: 62
End: 2022-11-10

## 2022-11-10 VITALS
HEART RATE: 81 BPM | TEMPERATURE: 96.8 F | WEIGHT: 224 LBS | HEIGHT: 62 IN | SYSTOLIC BLOOD PRESSURE: 124 MMHG | BODY MASS INDEX: 41.22 KG/M2 | OXYGEN SATURATION: 97 % | DIASTOLIC BLOOD PRESSURE: 80 MMHG

## 2022-11-10 DIAGNOSIS — I10 ESSENTIAL HYPERTENSION: ICD-10-CM

## 2022-11-10 DIAGNOSIS — Z87.891 PERSONAL HISTORY OF TOBACCO USE, PRESENTING HAZARDS TO HEALTH: ICD-10-CM

## 2022-11-10 DIAGNOSIS — Z12.2 SCREENING FOR LUNG CANCER: ICD-10-CM

## 2022-11-10 DIAGNOSIS — E11.9 ENCOUNTER FOR DIABETIC FOOT EXAM: ICD-10-CM

## 2022-11-10 DIAGNOSIS — E66.01 CLASS 3 SEVERE OBESITY DUE TO EXCESS CALORIES WITH SERIOUS COMORBIDITY AND BODY MASS INDEX (BMI) OF 40.0 TO 44.9 IN ADULT: ICD-10-CM

## 2022-11-10 DIAGNOSIS — E11.9 DIABETES MELLITUS WITHOUT COMPLICATION: Primary | ICD-10-CM

## 2022-11-10 LAB
EXPIRATION DATE: NORMAL
HBA1C MFR BLD: 6.2 %
Lab: NORMAL

## 2022-11-10 PROCEDURE — 99214 OFFICE O/P EST MOD 30 MIN: CPT | Performed by: FAMILY MEDICINE

## 2022-11-10 PROCEDURE — 83036 HEMOGLOBIN GLYCOSYLATED A1C: CPT | Performed by: FAMILY MEDICINE

## 2022-11-10 RX ORDER — ALBUTEROL SULFATE 90 UG/1
2 AEROSOL, METERED RESPIRATORY (INHALATION) EVERY 6 HOURS PRN
COMMUNITY
Start: 2022-10-17

## 2022-11-10 NOTE — PROGRESS NOTES
"Chief Complaint  Diabetes (A1c check) and Hypertension (Bp check )    Subjective        Tatiana Turner presents to [facility] PRIMARY CARE  History of Present Illness  Pleasant 62-year-old female here to follow-up for type 2 diabetes well controlled and hypertension well-controlled, at last appointment we increased lisinopril from 10 to 20 mg.  She has been tolerating this change well without any adverse effects.    From an obesity standpoint, she has been taking the Contrave irregularly.  Usually taking the 2 tablets in the morning but not the second dose in the evening.  She is also not been watching her diet.  She was previously motivated with her daughter's wedding but is now not as mindful of her diet.  She does want to be healthy.  She has been gaining weight.    Objective   Vital Signs:  /80 (BP Location: Right arm, Patient Position: Sitting, Cuff Size: Adult)   Pulse 81   Temp 96.8 °F (36 °C) (Infrared)   Ht 157.5 cm (62\")   Wt 102 kg (224 lb)   SpO2 97%   BMI 40.97 kg/m²   Estimated body mass index is 40.97 kg/m² as calculated from the following:    Height as of this encounter: 157.5 cm (62\").    Weight as of this encounter: 102 kg (224 lb).    Class 3 Severe Obesity (BMI >=40). Obesity-related health conditions include the following: hypertension, diabetes mellitus, dyslipidemias, GERD and osteoarthritis. Obesity is worsening. BMI is is above average; BMI management plan is completed. We discussed portion control, increasing exercise, an ruddy-based approach such as Reds10 Pal or Lose It and stop meds.      Physical Exam  Vitals and nursing note reviewed.   Constitutional:       General: She is not in acute distress.     Appearance: She is well-developed.   HENT:      Head: Normocephalic.      Nose: Nose normal.   Cardiovascular:      Rate and Rhythm: Normal rate and regular rhythm.      Heart sounds: Normal heart sounds. No murmur heard.  Pulmonary:      Effort: Pulmonary " effort is normal. No respiratory distress.      Breath sounds: Normal breath sounds.   Musculoskeletal:         General: Normal range of motion.   Skin:     General: Skin is warm and dry.      Findings: No rash.   Neurological:      Mental Status: She is alert and oriented to person, place, and time.   Psychiatric:         Behavior: Behavior normal.         Thought Content: Thought content normal.         Judgment: Judgment normal.        Result Review :  The following data was reviewed by: Dawna Abarca MD on 11/10/2022:  A1C Last 3 Results    HGBA1C Last 3 Results 4/1/22 7/27/22 11/10/22   Hemoglobin A1C 6.3 6.4 (A) 6.2   (A) Abnormal value       Comments are available for some flowsheets but are not being displayed.           Diabetic foot exam:   Left: Filament test present   Pulses Dorsalis Pedis:  present  Posterior Tibial:  present   Proprioception normal   Sharp/dull discrimination normal       Right: Filament test present   Pulses Dorsalis Pedis:  present  Posterior Tibial:  present   Proprioception normal   Sharp/dull discrimination normal              Assessment and Plan   Diagnoses and all orders for this visit:    1. Diabetes mellitus without complication (HCC) (Primary)  -     POC Glycosylated Hemoglobin (Hb A1C)    2. Essential hypertension    3. Class 3 severe obesity due to excess calories with serious comorbidity and body mass index (BMI) of 40.0 to 44.9 in adult ([facility])    4. Screening for lung cancer  -     CT Chest Low Dose Wo; Future    5. Personal history of tobacco use, presenting hazards to health  -     CT Chest Low Dose Wo; Future    6. Encounter for diabetic foot exam ([facility])    Type 2 diabetes well controlled okay to continue with same meds no changes.  Diabetic foot exam performed today that was reassuring.  Recommended Kerasal ointment for urea foot cream to help with calluses.    Hypertension well controlled with increased dose of lisinopril.  Continue with same no changes.  Goal blood  pressure less than 130/90.    Class III obesity that is worsening.  She has gained 10 pounds since her last appointment.  We discussed this.  She has not been taking the Contrave as prescribed and has not been watching her diet.  Therefore we discontinued the Contrave, recommended maintaining a consistent carb diet with around 50 g per meal, recommended using the lose it ruddy to count her calories and carbs.    She is also due for lung cancer screening, she quit in 2010, 30-pack-year smoking history.  We will continue for the next 3 years.         Follow Up   Return in about 3 months (around 2/10/2023), or if symptoms worsen or fail to improve, for Recheck Diabetes with labs prior .  Patient was given instructions and counseling regarding her condition or for health maintenance advice. Please see specific information pulled into the AVS if appropriate. Medical assistant and I wore mask and eyewear protection during entire encounter.  Patient wore mask.    Dawna Abarca MD

## 2022-11-14 DIAGNOSIS — E03.9 ACQUIRED HYPOTHYROIDISM: ICD-10-CM

## 2022-11-14 RX ORDER — LEVOTHYROXINE SODIUM 0.12 MG/1
TABLET ORAL
Qty: 90 TABLET | Refills: 3 | Status: SHIPPED | OUTPATIENT
Start: 2022-11-14

## 2022-11-21 ENCOUNTER — HOSPITAL ENCOUNTER (OUTPATIENT)
Dept: CT IMAGING | Facility: HOSPITAL | Age: 62
Discharge: HOME | End: 2022-11-21
Admitting: FAMILY MEDICINE

## 2022-11-21 DIAGNOSIS — Z87.891 PERSONAL HISTORY OF TOBACCO USE, PRESENTING HAZARDS TO HEALTH: ICD-10-CM

## 2022-11-21 DIAGNOSIS — Z12.2 SCREENING FOR LUNG CANCER: ICD-10-CM

## 2022-11-21 PROCEDURE — 71271 CT THORAX LUNG CANCER SCR C-: CPT

## 2022-12-13 ENCOUNTER — APPOINTMENT (OUTPATIENT)
Dept: WOMENS IMAGING | Facility: HOSPITAL | Age: 62
End: 2022-12-13

## 2022-12-13 PROCEDURE — 77063 BREAST TOMOSYNTHESIS BI: CPT | Performed by: RADIOLOGY

## 2022-12-13 PROCEDURE — 77067 SCR MAMMO BI INCL CAD: CPT | Performed by: RADIOLOGY

## 2023-02-19 DIAGNOSIS — I10 ESSENTIAL HYPERTENSION: ICD-10-CM

## 2023-02-19 DIAGNOSIS — E11.9 DIABETES MELLITUS WITHOUT COMPLICATION: ICD-10-CM

## 2023-02-20 RX ORDER — LISINOPRIL AND HYDROCHLOROTHIAZIDE 12.5; 1 MG/1; MG/1
2 TABLET ORAL DAILY
Qty: 180 TABLET | Refills: 3 | Status: SHIPPED | OUTPATIENT
Start: 2023-02-20

## 2023-02-20 RX ORDER — METFORMIN HYDROCHLORIDE 500 MG/1
500 TABLET, EXTENDED RELEASE ORAL
Qty: 90 TABLET | Refills: 3 | Status: SHIPPED | OUTPATIENT
Start: 2023-02-20

## 2023-02-23 ENCOUNTER — OFFICE VISIT (OUTPATIENT)
Dept: FAMILY MEDICINE CLINIC | Facility: CLINIC | Age: 63
End: 2023-02-23
Payer: COMMERCIAL

## 2023-02-23 VITALS
DIASTOLIC BLOOD PRESSURE: 78 MMHG | BODY MASS INDEX: 41.41 KG/M2 | HEART RATE: 90 BPM | HEIGHT: 62 IN | OXYGEN SATURATION: 98 % | TEMPERATURE: 97.6 F | SYSTOLIC BLOOD PRESSURE: 112 MMHG | WEIGHT: 225 LBS

## 2023-02-23 DIAGNOSIS — E11.9 DIABETES MELLITUS WITHOUT COMPLICATION: Primary | ICD-10-CM

## 2023-02-23 DIAGNOSIS — E66.01 CLASS 3 SEVERE OBESITY DUE TO EXCESS CALORIES WITH SERIOUS COMORBIDITY AND BODY MASS INDEX (BMI) OF 40.0 TO 44.9 IN ADULT: ICD-10-CM

## 2023-02-23 LAB
EXPIRATION DATE: 2024
HBA1C MFR BLD: 6.3 %
Lab: 43

## 2023-02-23 PROCEDURE — 83036 HEMOGLOBIN GLYCOSYLATED A1C: CPT | Performed by: FAMILY MEDICINE

## 2023-02-23 PROCEDURE — 36416 COLLJ CAPILLARY BLOOD SPEC: CPT | Performed by: FAMILY MEDICINE

## 2023-02-23 PROCEDURE — 99214 OFFICE O/P EST MOD 30 MIN: CPT | Performed by: FAMILY MEDICINE

## 2023-02-23 NOTE — PROGRESS NOTES
"Chief Complaint  Diabetes (3 months follow up  a1c no complains doing well )    Subjective        Tatiana Turner presents to [facility] PRIMARY [facility]ARE  History of Present Illness  Pleasant 62-year-old female here for type 2 diabetes which is well controlled on current meds no adverse effects doing well.    Obesity, thankfully she has not gained weight since her lab appointment.  She stopped Contrave.  She has been trying to lose weight, thankfully she has maintained her weight since the last appointment.  Starting to track her calories more recently.    Objective   Vital Signs:  /78   Pulse 90   Temp 97.6 °F (36.4 °C)   Ht 157.5 cm (62\")   Wt 102 kg (225 lb)   SpO2 98%   BMI 41.15 kg/m²   Estimated body mass index is 41.15 kg/m² as calculated from the following:    Height as of this encounter: 157.5 cm (62\").    Weight as of this encounter: 102 kg (225 lb).             Physical Exam  Vitals and nursing note reviewed.   Constitutional:       General: She is not in acute distress.     Appearance: She is well-developed.   HENT:      Head: Normocephalic.      Nose: Nose normal.   Eyes:      General: No scleral icterus.  Pulmonary:      Effort: Pulmonary effort is normal. No respiratory distress.   Musculoskeletal:         General: Normal range of motion.   Skin:     General: Skin is warm and dry.      Findings: No rash.   Neurological:      Mental Status: She is alert and oriented to person, place, and time.   Psychiatric:         Behavior: Behavior normal.         Thought Content: Thought content normal.         Judgment: Judgment normal.        Result Review :  The following data was reviewed by: Dawna Jaime MD on 02/23/2023:  A1C Last 3 Results    HGBA1C Last 3 Results 7/27/22 11/10/22 2/23/23   Hemoglobin A1C 6.4 (A) 6.2 6.3   (A) Abnormal value       Comments are available for some flowsheets but are not being displayed.                        Assessment and Plan   Diagnoses and " all orders for this visit:    1. Diabetes mellitus without complication (HCC) (Primary)  -     POC Glycosylated Hemoglobin (Hb A1C)  -     Comprehensive Metabolic Panel; Future  -     CBC & Differential; Future  -     Lipid Panel; Future  -     Hemoglobin A1c; Future  -     Vitamin D,25-Hydroxy; Future  -     TSH; Future  -     T4, free; Future    2. Class 3 severe obesity due to excess calories with serious comorbidity and body mass index (BMI) of 40.0 to 44.9 in adult (HCC)    Type 2 diabetes well controlled, no change to metformin 500 mg daily.  Plan to continue with this and monitor carbs as we have discussed in the past.    From an obesity standpoint she is stable, goal to get back to 215 pounds which is where she was this summer.  She starting to track her macros and has started exercising 4 to 5 days/week.  Plan to continue same and follow-up in 3 months.         Follow Up   Return in about 3 months (around 5/23/2023), or if symptoms worsen or fail to improve, for Annual Physical and diabetes with fasting labs prior (OK o overbook).  Patient was given instructions and counseling regarding her condition or for health maintenance advice. Please see specific information pulled into the AVS if appropriate. Medical assistant and I wore mask and eyewear protection during entire encounter.  Patient wore mask.    Dawna Jaime MD

## 2023-05-26 ENCOUNTER — OFFICE VISIT (OUTPATIENT)
Dept: FAMILY MEDICINE CLINIC | Facility: CLINIC | Age: 63
End: 2023-05-26
Payer: COMMERCIAL

## 2023-05-26 VITALS
DIASTOLIC BLOOD PRESSURE: 84 MMHG | TEMPERATURE: 96.9 F | RESPIRATION RATE: 16 BRPM | SYSTOLIC BLOOD PRESSURE: 110 MMHG | BODY MASS INDEX: 41.22 KG/M2 | OXYGEN SATURATION: 98 % | HEART RATE: 72 BPM | WEIGHT: 224 LBS | HEIGHT: 62 IN

## 2023-05-26 DIAGNOSIS — Z00.00 HEALTH MAINTENANCE EXAMINATION: Primary | ICD-10-CM

## 2023-05-26 DIAGNOSIS — Z12.11 SCREEN FOR COLON CANCER: ICD-10-CM

## 2023-05-26 PROCEDURE — 99396 PREV VISIT EST AGE 40-64: CPT | Performed by: FAMILY MEDICINE

## 2023-05-26 NOTE — PROGRESS NOTES
"Chief Complaint  Annual Exam (Discuss labs)    Subjective        Tatiana Turner presents to [facility] PRIMARY [facility]ARE  History of Present Illness  Annual Exam.    Health Maintenance   Topic Date Due   • TDAP/TD VACCINES (1 - Tdap) Never done   • ZOSTER VACCINE (1 of 2) Never done   • COLORECTAL CANCER SCREENING  01/01/2019   • COVID-19 Vaccine (3 - Booster for Moderna series) 06/09/2021   • ANNUAL PHYSICAL  12/08/2021   • MAMMOGRAM  12/07/2022   • INFLUENZA VACCINE  08/01/2023   • DIABETIC EYE EXAM  08/01/2023   • DIABETIC FOOT EXAM  11/10/2023   • LUNG CANCER SCREENING  11/21/2023   • HEMOGLOBIN A1C  11/22/2023   • LIPID PANEL  05/22/2024   • PAP SMEAR  12/01/2024   • HEPATITIS C SCREENING  Completed   • Pneumococcal Vaccine 0-64  Completed   • URINE MICROALBUMIN  Discontinued     Diet: Not eating well, just to back from [place].   Exercise: she is exercising regularly - every day M-F  GYN: Sees Dr Romero   Immunizations: UTD   Colon cancer screening: UTD in 2017  Sleep/mental health: Doing well  Dentist: UTD   Vision: UTD  Skin: Annual in Jan     Objective   Vital Signs:  /84   Pulse 72   Temp 96.9 °F (36.1 °C)   Resp 16   Ht 157.5 cm (62.01\")   Wt 102 kg (224 lb)   SpO2 98%   BMI 40.96 kg/m²   Estimated body mass index is 40.96 kg/m² as calculated from the following:    Height as of this encounter: 157.5 cm (62.01\").    Weight as of this encounter: 102 kg (224 lb).             Physical Exam  Vitals and nursing note reviewed.   Constitutional:       General: She is not in acute distress.     Appearance: She is well-developed.   HENT:      Head: Normocephalic.      Nose: Nose normal.   Cardiovascular:      Rate and Rhythm: Normal rate and regular rhythm.      Heart sounds: Normal heart sounds. No murmur heard.  Pulmonary:      Effort: Pulmonary effort is normal. No respiratory distress.      Breath sounds: Normal breath sounds.   Musculoskeletal:         General: Normal range of " motion.   Skin:     General: Skin is warm and dry.      Findings: No rash.   Neurological:      Mental Status: She is alert and oriented to person, place, and time.   Psychiatric:         Behavior: Behavior normal.         Thought Content: Thought content normal.         Judgment: Judgment normal.        Result Review :  The following data was reviewed by: Dawna Jaime MD on 05/26/2023:  CMP        7/27/2022    08:47 5/22/2023    08:44   CMP   Glucose 117   159     BUN 18   19     Creatinine 0.77   0.82     Sodium 140   137     Potassium 5.1   4.9     Chloride 100   98     Calcium 9.6   10.2     Total Protein 6.9   6.8     Albumin 4.4   4.5     Globulin 2.5   2.3     Total Bilirubin 0.6   0.4     Alkaline Phosphatase 76   78     AST (SGOT) 21   21     ALT (SGPT) 23   25     BUN/Creatinine Ratio 23   23.2       CBC        7/27/2022    08:47 5/22/2023    08:44   CBC   WBC 6.7   6.62     RBC 4.58   4.34     Hemoglobin 13.4   12.8     Hematocrit 42.2   39.2     MCV 92   90.3     MCH 29.3   29.5     MCHC 31.8   32.7     RDW 13.2   13.2     Platelets 281   284       Lipid Panel        7/27/2022    08:47 5/22/2023    08:44   Lipid Panel   Total Cholesterol 229   218     Triglycerides 229   244     HDL Cholesterol 70   63     VLDL Cholesterol 39   42     LDL Cholesterol  120   113       TSH        7/27/2022    08:47 5/22/2023    08:44   TSH   TSH 1.700   1.340       A1C Last 3 Results        11/10/2022    13:47 2/23/2023    12:47 5/22/2023    08:44   HGBA1C Last 3 Results   Hemoglobin A1C 6.2   6.3   6.30                    Assessment and Plan   Diagnoses and all orders for this visit:    1. Health maintenance examination (Primary)    2. Screen for colon cancer  -     Ambulatory Referral For Screening Colonoscopy      Here for annual exam, fasting labs reviewed with patient, immunizations up-to-date, colon cancer screening due this year up-to-date, GYN health up-to-date, cardiovascular screening negative for symptoms,  counseled patient to increase vegetable intake, water and 150 minutes of exercise weekly combining weightbearing exercises and aerobic activity.    Discussed her overall goals, her blood sugar has not improved and neither has her weight.  She does agree that she has not been watching her diet although she does exercise very regularly.  I am encouraged by this but as her cholesterol is starting to go up and she has hyperglycemia, her coronary artery disease risk is steadily increasing.  Unfortunately she cannot tolerate many statins but does do well with Livalo 4 mg daily.  This is helpful but still a very small dose.    We discussed really looking seriously into lifestyle goals and overall outcomes over the next 10 to 15 years.  Realizing that if she continues on her current trajectory she could succumb to cardiovascular events.       Follow Up   Return in about 3 months (around 8/26/2023), or if symptoms worsen or fail to improve, for Recheck  hyperglemia and weight .  Patient was given instructions and counseling regarding her condition or for health maintenance advice. Please see specific information pulled into the AVS if appropriate.     Dawna Jaime MD

## 2023-08-06 DIAGNOSIS — J41.0 SIMPLE CHRONIC BRONCHITIS: ICD-10-CM

## 2023-08-07 RX ORDER — FLUTICASONE PROPIONATE AND SALMETEROL XINAFOATE 115; 21 UG/1; UG/1
AEROSOL, METERED RESPIRATORY (INHALATION)
Qty: 36 G | Refills: 3 | Status: SHIPPED | OUTPATIENT
Start: 2023-08-07

## 2023-09-07 ENCOUNTER — OFFICE VISIT (OUTPATIENT)
Dept: FAMILY MEDICINE CLINIC | Facility: CLINIC | Age: 63
End: 2023-09-07
Payer: COMMERCIAL

## 2023-09-07 VITALS
HEIGHT: 62 IN | SYSTOLIC BLOOD PRESSURE: 126 MMHG | WEIGHT: 228 LBS | TEMPERATURE: 97.6 F | OXYGEN SATURATION: 99 % | BODY MASS INDEX: 41.96 KG/M2 | DIASTOLIC BLOOD PRESSURE: 84 MMHG | HEART RATE: 86 BPM

## 2023-09-07 DIAGNOSIS — E11.9 DIABETES MELLITUS WITHOUT COMPLICATION: Primary | ICD-10-CM

## 2023-09-07 DIAGNOSIS — K21.9 GASTROESOPHAGEAL REFLUX DISEASE, UNSPECIFIED WHETHER ESOPHAGITIS PRESENT: ICD-10-CM

## 2023-09-07 DIAGNOSIS — E66.01 CLASS 3 SEVERE OBESITY DUE TO EXCESS CALORIES WITH SERIOUS COMORBIDITY AND BODY MASS INDEX (BMI) OF 40.0 TO 44.9 IN ADULT: ICD-10-CM

## 2023-09-07 LAB
EXPIRATION DATE: NORMAL
HBA1C MFR BLD: 6.4 %
Lab: 618

## 2023-09-07 RX ORDER — FAMOTIDINE 40 MG/1
40 TABLET, FILM COATED ORAL DAILY
Qty: 90 TABLET | Refills: 3 | Status: SHIPPED | OUTPATIENT
Start: 2023-09-07

## 2023-09-07 NOTE — PROGRESS NOTES
"Chief Complaint  Weight Check (Follow up  3 months   ,no complains  tried contrave but not any longer interested ), Diabetes (No complains doing well ), and Heartburn (Will need famotide refill it does help when take it as need it )    Subjective        Tatiana Turner presents to [facility] PRIMARY [facility]ARE  Diabetes    Heartburn    Pleasant 62-year-old female here to follow-up for obesity which unfortunately is worsening.  She was previously on Contrave without substantial benefit.  We talked through various treatment options including Ozempic.  She is not sure what she would like to do.    Type 2 diabetes: Slightly worsening since last appointment.  Well-controlled on metformin    Heartburn, improving with Pepcid.  Requesting a refill for this.    Objective   Vital Signs:  /84   Pulse 86   Temp 97.6 °F (36.4 °C)   Ht 157.5 cm (62\")   Wt 103 kg (228 lb)   SpO2 99%   BMI 41.70 kg/m²   Estimated body mass index is 41.7 kg/m² as calculated from the following:    Height as of this encounter: 157.5 cm (62\").    Weight as of this encounter: 103 kg (228 lb).       Class 3 Severe Obesity (BMI >=40). Obesity-related health conditions include the following: hypertension, dyslipidemias, GERD, and osteoarthritis. Obesity is worsening. BMI is is above average; BMI management plan is completed. We discussed portion control, increasing exercise, and pharmacologic options including Contrave but and may consider other options .      Physical Exam  Vitals and nursing note reviewed.   Constitutional:       General: She is not in acute distress.     Appearance: She is well-developed.   HENT:      Head: Normocephalic.      Nose: Nose normal.   Eyes:      General: No scleral icterus.  Pulmonary:      Effort: Pulmonary effort is normal. No respiratory distress.   Musculoskeletal:         General: Normal range of motion.   Skin:     General: Skin is warm and dry.      Findings: No rash.   Neurological:      " Mental Status: She is alert and oriented to person, place, and time.   Psychiatric:         Behavior: Behavior normal.         Thought Content: Thought content normal.         Judgment: Judgment normal.      Result Review :  The following data was reviewed by: Dawna Jaime MD on 09/07/2023:  A1C Last 3 Results          2/23/2023    12:47 5/22/2023    08:44 9/7/2023    12:45   HGBA1C Last 3 Results   Hemoglobin A1C 6.3  6.30  6.4                   Assessment and Plan   Diagnoses and all orders for this visit:    1. Diabetes mellitus without complication (Primary)  -     POC Glycosylated Hemoglobin (Hb A1C)  -     Comprehensive Metabolic Panel; Future  -     CBC & Differential; Future  -     Lipid Panel; Future  -     Hemoglobin A1c; Future  -     Thyroid Panel With TSH; Future    2. Gastroesophageal reflux disease, unspecified whether esophagitis present  -     famotidine (PEPCID) 40 MG tablet; Take 1 tablet by mouth Daily.  Dispense: 90 tablet; Refill: 3    3. Class 3 severe obesity due to excess calories with serious comorbidity and body mass index (BMI) of 40.0 to 44.9 in adult  Assessment & Plan:  Patient's (Body mass index is 41.7 kg/m².) indicates that they are morbidly obese (BMI > 40 or > 35 with obesity - related health condition) with health conditions that include obstructive sleep apnea, hypertension, diabetes mellitus, dyslipidemias and osteoarthritis . Weight is improving with treatment. BMI is is above average; BMI management plan is completed. We discussed portion control, increasing exercise, management of depression/anxiety/stress to control compensatory eating and pharmacologic options including Contrave which was discontinued due to price, now not on weight loss medication .  She has gained 4 pounds since her last appointment.      Type 2 diabetes thankfully well controlled on metformin 500 mg daily, she has had a slight worsening in her blood sugar and also weight gain.  Likely attributed to  eating more out of boredom more lonely some with her  working nights.  This is a recent change for them.  She is wanting to lose weight but is not motivated and is concerned about the cost of medication and potential side effects.    We spent a significant amount of time talking through Ozempic, also mentioning Trulicity or Saxenda.  We talked through risk benefits alternatives, risk for side effects and dosing frequencies.  We also discussed dietary changes that would be necessary.  And ideally I would like to see those changes made without medication.  She is not sure if she would like to start a GLP-1 so she will do some research on it, car insurance and then call back if she decides to start.    GERD well-controlled on Pepcid 40.       Follow Up   Return in about 3 months (around 12/7/2023), or if symptoms worsen or fail to improve, for Recheck diabetes and weight loss with labs prior .  Patient was given instructions and counseling regarding her condition or for health maintenance advice. Please see specific information pulled into the AVS if appropriate.     Dawna Jaime MD

## 2023-09-07 NOTE — ASSESSMENT & PLAN NOTE
Patient's (Body mass index is 41.7 kg/m².) indicates that they are morbidly obese (BMI > 40 or > 35 with obesity - related health condition) with health conditions that include obstructive sleep apnea, hypertension, diabetes mellitus, dyslipidemias and osteoarthritis . Weight is improving with treatment. BMI is is above average; BMI management plan is completed. We discussed portion control, increasing exercise, management of depression/anxiety/stress to control compensatory eating and pharmacologic options including Contrave which was discontinued due to price, now not on weight loss medication .  She has gained 4 pounds since her last appointment.

## 2023-09-08 ENCOUNTER — TELEPHONE (OUTPATIENT)
Dept: FAMILY MEDICINE CLINIC | Facility: CLINIC | Age: 63
End: 2023-09-08
Payer: COMMERCIAL

## 2023-09-08 DIAGNOSIS — E11.9 DIABETES MELLITUS WITHOUT COMPLICATION: Primary | ICD-10-CM

## 2023-09-08 NOTE — TELEPHONE ENCOUNTER
Caller: Tatiana Turner    Relationship: Self    Best call back number: [phone]     What is the best time to reach you: ANY    Who are you requesting to speak with (clinical staff, provider,  specific staff member): CLINICAL STAFF    Do you know the name of the person who called:      What was the call regarding: PATIENT WAS THERE AT OFFICE YESTERDAY 9/07/23 AND THE MEDICATION OZEMPIC SHE CHECKED WITH HER INSURANCE THEY WILL COVER IT.  PATIENT STATES SHE DOES HAVE A COUPLE OF QUESTIONS ABOUT ITAND IF DR CHAVES GO AHEAD PRESCRIBE THIS MEDICATION.      Is it okay if the provider responds through MyChart:

## 2023-09-11 DIAGNOSIS — E11.9 DIABETES MELLITUS WITHOUT COMPLICATION: ICD-10-CM

## 2023-09-11 NOTE — TELEPHONE ENCOUNTER
I went ahead and prescribed the Ozempic, do mind calling to follow-up on any questions?  I went through handout with her in the office and discussed starting the medication when she was here last.

## 2023-09-11 NOTE — TELEPHONE ENCOUNTER
As her A1c is well controlled I think it would be fine for her to stop the metformin and transition to only Ozempic.  There is not really a right or wrong answer.

## 2023-09-18 RX ORDER — PITAVASTATIN CALCIUM 4.18 MG/1
TABLET, FILM COATED ORAL
Qty: 90 TABLET | Refills: 3 | Status: SHIPPED | OUTPATIENT
Start: 2023-09-18

## 2023-10-28 DIAGNOSIS — E11.9 DIABETES MELLITUS WITHOUT COMPLICATION: ICD-10-CM

## 2023-10-30 RX ORDER — SEMAGLUTIDE 0.68 MG/ML
INJECTION, SOLUTION SUBCUTANEOUS
Qty: 6 ML | Refills: 5 | Status: SHIPPED | OUTPATIENT
Start: 2023-10-30

## 2023-11-17 DIAGNOSIS — E03.9 ACQUIRED HYPOTHYROIDISM: ICD-10-CM

## 2023-11-17 RX ORDER — LEVOTHYROXINE SODIUM 0.12 MG/1
TABLET ORAL
Qty: 90 TABLET | Refills: 3 | Status: SHIPPED | OUTPATIENT
Start: 2023-11-17

## 2023-12-01 ENCOUNTER — FLU SHOT (OUTPATIENT)
Dept: FAMILY MEDICINE CLINIC | Facility: CLINIC | Age: 63
End: 2023-12-01
Payer: COMMERCIAL

## 2023-12-01 DIAGNOSIS — Z23 NEED FOR VACCINATION: Primary | ICD-10-CM

## 2023-12-01 DIAGNOSIS — E11.9 DIABETES MELLITUS WITHOUT COMPLICATION: ICD-10-CM

## 2023-12-01 PROCEDURE — 90471 IMMUNIZATION ADMIN: CPT | Performed by: FAMILY MEDICINE

## 2023-12-01 PROCEDURE — 90686 IIV4 VACC NO PRSV 0.5 ML IM: CPT | Performed by: FAMILY MEDICINE

## 2023-12-01 NOTE — TELEPHONE ENCOUNTER
Caller: Tatiana Turner    Relationship: Self    Best call back number: [phone]    Requested Prescriptions:   Requested Prescriptions     Pending Prescriptions Disp Refills    Semaglutide, 1 MG/DOSE, (OZEMPIC) 4 MG/3ML solution pen-injector 3 mL 0     Sig: Inject 1 mg under the skin into the appropriate area as directed 1 (One) Time Per Week.        Pharmacy where request should be sent: [facility]) - [place] [address] [facility] [phone] [facility] [phone]      Last office visit with prescribing clinician: 9/7/2023   Last telemedicine visit with prescribing clinician: Visit date not found   Next office visit with prescribing clinician: 12/18/2023     Additional details provided by patient: THE PATIENT TAKES HER LAST INJECTION ON MONDAY, 12/04/2023. THE PATIENT HAS AN UPCOMING APPOINTMENT TO BE SEEN ON 12/18/2023. PLEASE ADVISE.     Does the patient have less than a 3 day supply:  [x] Yes  [] No    Would you like a call back once the refill request has been completed: [x] Yes [] No    If the office needs to give you a call back, can they leave a voicemail: [x] Yes [] No    Lashaun Daugherty Rep   12/01/23 08:19 EST

## 2023-12-18 ENCOUNTER — OFFICE VISIT (OUTPATIENT)
Dept: FAMILY MEDICINE CLINIC | Facility: CLINIC | Age: 63
End: 2023-12-18
Payer: COMMERCIAL

## 2023-12-18 VITALS
SYSTOLIC BLOOD PRESSURE: 136 MMHG | DIASTOLIC BLOOD PRESSURE: 82 MMHG | OXYGEN SATURATION: 100 % | BODY MASS INDEX: 40.12 KG/M2 | HEIGHT: 62 IN | HEART RATE: 70 BPM | TEMPERATURE: 97.8 F | WEIGHT: 218 LBS

## 2023-12-18 DIAGNOSIS — G63 POLYNEUROPATHY ASSOCIATED WITH UNDERLYING DISEASE: ICD-10-CM

## 2023-12-18 DIAGNOSIS — E11.42 TYPE 2 DIABETES MELLITUS WITH DIABETIC POLYNEUROPATHY, WITHOUT LONG-TERM CURRENT USE OF INSULIN: Primary | ICD-10-CM

## 2023-12-18 DIAGNOSIS — Z12.11 SCREEN FOR COLON CANCER: ICD-10-CM

## 2023-12-18 DIAGNOSIS — Z87.891 PERSONAL HISTORY OF TOBACCO USE, PRESENTING HAZARDS TO HEALTH: ICD-10-CM

## 2023-12-18 DIAGNOSIS — Z12.2 SCREENING FOR LUNG CANCER: ICD-10-CM

## 2023-12-18 DIAGNOSIS — E11.9 ENCOUNTER FOR DIABETIC FOOT EXAM: ICD-10-CM

## 2023-12-18 NOTE — ASSESSMENT & PLAN NOTE
Diabetes is well controlled on current meds.  Asymptomatic.    She is trying to be more consistent with eating healthy and exercising.  She does tend to be on and off in terms of lifestyle changes.  We discussed the importance of consistency pacifically with not eating extra sweets and exercising regularly.      Complications: Numbness and tingling in the feet bilaterally.    Plan:  - Continue lifestyle management  -Continue with Ozempic, increase dose from 1 mg to 2 mg weekly.  -ACE inhibitor present  -She is on a statin, Livalo 4 mg daily, she has had myalgia with atorvastatin, rosuvastatin and ezetimibe.  -Advise starting B12.  She has stopped metformin.  -Follow-up in 3 months

## 2023-12-18 NOTE — PROGRESS NOTES
"Chief Complaint  Diabetes (Follow up no complains  had pink eye went to urgent care yesterday   will need hoigher dose of ozempic needs to be send to the pharmacy )    Subjective        Tatiana Turner presents to [facility]  History of Present Illness    Pleasant 63-year-old female here to follow-up for type 2 diabetes which is thankfully well-controlled on current meds.  We have been starting Ozempic which has thankfully been helpful and she has been gradually trending up on her dose.  She has some nausea on the first day but otherwise tolerating well with no adverse effects.  She does have some complications of peripheral neuropathy with diabetes, numbness and tingling in her feet.    Objective   Vital Signs:  /82   Pulse 70   Temp 97.8 °F (36.6 °C)   Ht 157.5 cm (62\")   Wt 98.9 kg (218 lb)   SpO2 100%   BMI 39.87 kg/m²   Estimated body mass index is 39.87 kg/m² as calculated from the following:    Height as of this encounter: 157.5 cm (62\").    Weight as of this encounter: 98.9 kg (218 lb).       Physical Exam  Vitals and nursing note reviewed.   Constitutional:       General: She is not in acute distress.     Appearance: She is well-developed.   HENT:      Head: Normocephalic.      Nose: Nose normal.   Cardiovascular:      Rate and Rhythm: Normal rate and regular rhythm.      Heart sounds: Normal heart sounds. No murmur heard.  Pulmonary:      Effort: Pulmonary effort is normal. No respiratory distress.      Breath sounds: Normal breath sounds.   Musculoskeletal:         General: Normal range of motion.   Skin:     General: Skin is warm and dry.      Findings: No rash.   Neurological:      Mental Status: She is alert and oriented to person, place, and time.   Psychiatric:         Behavior: Behavior normal.         Thought Content: Thought content normal.         Judgment: Judgment normal.        Diabetic foot exam:   Left: Filament test present   Pulses Dorsalis " Pedis:  present  Posterior Tibial:  present   Proprioception normal   Sharp/dull discrimination diminished at the great toes bilaterally       Right: Filament test present   Pulses Dorsalis Pedis:  present  Posterior Tibial:  present   Proprioception normal   Sharp/dull discrimination diminished at the great toes bilaterally    Result Review :  The following data was reviewed by: Dawna Jaime MD on 12/18/2023:      Thyroid Panel With TSH (12/01/2023 10:33)  Hemoglobin A1c (12/01/2023 10:33)  Lipid Panel (12/01/2023 10:33)  CBC & Differential (12/01/2023 10:33)  Comprehensive Metabolic Panel (12/01/2023 10:33)           Assessment and Plan   Diagnoses and all orders for this visit:    1. Type 2 diabetes mellitus with diabetic polyneuropathy, without long-term current use of insulin (Primary)  Assessment & Plan:  Diabetes is well controlled on current meds.  Asymptomatic.    She is trying to be more consistent with eating healthy and exercising.  She does tend to be on and off in terms of lifestyle changes.  We discussed the importance of consistency pacifically with not eating extra sweets and exercising regularly.      Complications: Numbness and tingling in the feet bilaterally.    Plan:  - Continue lifestyle management  -Continue with Ozempic, increase dose from 1 mg to 2 mg weekly.  -ACE inhibitor present  -She is on a statin, Livalo 4 mg daily, she has had myalgia with atorvastatin, rosuvastatin and ezetimibe.  -Advise starting B12.  She has stopped metformin.  -Follow-up in 3 months    Orders:  -     Semaglutide, 2 MG/DOSE, (OZEMPIC) 8 MG/3ML solution pen-injector; Inject 2 mg under the skin into the appropriate area as directed 1 (One) Time Per Week.  Dispense: 9 mL; Refill: 1    2. Personal history of tobacco use, presenting hazards to health  -     CT Chest Low Dose Wo; Future    3. Screening for lung cancer  -     CT Chest Low Dose Wo; Future    4. Screen for colon cancer  -     Ambulatory Referral For  Screening Colonoscopy    5. Encounter for diabetic foot exam    6. Polyneuropathy associated with underlying disease  Comments:  Discussed starting B12 supplementation, at this time it is not bothersome enough to start gabapentin or Lyrica but will continue to follow.           Follow Up   Return in about 3 months (around 3/18/2024), or if symptoms worsen or fail to improve, for Recheck Diabetes  .  Patient was given instructions and counseling regarding her condition or for health maintenance advice. Please see specific information pulled into the AVS if appropriate.     Dawna Jaime MD

## 2024-01-08 ENCOUNTER — HOSPITAL ENCOUNTER (OUTPATIENT)
Dept: CT IMAGING | Facility: HOSPITAL | Age: 64
Discharge: HOME | End: 2024-01-08
Admitting: FAMILY MEDICINE
Payer: COMMERCIAL

## 2024-01-08 DIAGNOSIS — Z12.2 SCREENING FOR LUNG CANCER: ICD-10-CM

## 2024-01-08 DIAGNOSIS — Z87.891 PERSONAL HISTORY OF TOBACCO USE, PRESENTING HAZARDS TO HEALTH: ICD-10-CM

## 2024-01-08 PROCEDURE — 71271 CT THORAX LUNG CANCER SCR C-: CPT

## 2024-01-08 NOTE — PROGRESS NOTES
Please call patient back with results.  The CT Chest has resulted as Negative for lung cancer.  We can repeat this annually until you you either turn 82 years old or 15 years has passed since you quit smoking.    Thank you  
today

## 2024-02-23 DIAGNOSIS — I10 ESSENTIAL HYPERTENSION: ICD-10-CM

## 2024-02-23 RX ORDER — LISINOPRIL AND HYDROCHLOROTHIAZIDE 12.5; 1 MG/1; MG/1
2 TABLET ORAL DAILY
Qty: 180 TABLET | Refills: 3 | Status: SHIPPED | OUTPATIENT
Start: 2024-02-23

## 2024-03-19 ENCOUNTER — OFFICE VISIT (OUTPATIENT)
Dept: FAMILY MEDICINE CLINIC | Facility: CLINIC | Age: 64
End: 2024-03-19
Payer: COMMERCIAL

## 2024-03-19 VITALS
WEIGHT: 219 LBS | TEMPERATURE: 97.6 F | BODY MASS INDEX: 40.3 KG/M2 | SYSTOLIC BLOOD PRESSURE: 124 MMHG | DIASTOLIC BLOOD PRESSURE: 76 MMHG | HEART RATE: 75 BPM | OXYGEN SATURATION: 98 % | HEIGHT: 62 IN

## 2024-03-19 DIAGNOSIS — E03.9 ACQUIRED HYPOTHYROIDISM: ICD-10-CM

## 2024-03-19 DIAGNOSIS — E11.42 TYPE 2 DIABETES MELLITUS WITH DIABETIC POLYNEUROPATHY, WITHOUT LONG-TERM CURRENT USE OF INSULIN: Primary | ICD-10-CM

## 2024-03-19 DIAGNOSIS — E78.2 MIXED HYPERLIPIDEMIA: ICD-10-CM

## 2024-03-19 DIAGNOSIS — J41.0 SIMPLE CHRONIC BRONCHITIS: ICD-10-CM

## 2024-03-19 DIAGNOSIS — I10 ESSENTIAL HYPERTENSION: ICD-10-CM

## 2024-03-19 LAB
EXPIRATION DATE: ABNORMAL
HBA1C MFR BLD: 6 % (ref 4.5–5.7)
Lab: 678

## 2024-03-19 RX ORDER — LEVOTHYROXINE SODIUM 0.12 MG/1
125 TABLET ORAL DAILY
Qty: 90 TABLET | Refills: 3 | Status: SHIPPED | OUTPATIENT
Start: 2024-03-19

## 2024-03-19 RX ORDER — BUDESONIDE AND FORMOTEROL FUMARATE DIHYDRATE 160; 4.5 UG/1; UG/1
2 AEROSOL RESPIRATORY (INHALATION)
Qty: 10.2 G | Refills: 2 | Status: SHIPPED | OUTPATIENT
Start: 2024-03-19

## 2024-03-19 RX ORDER — OMEGA-3-ACID ETHYL ESTERS 1 G/1
CAPSULE, LIQUID FILLED ORAL
Qty: 360 CAPSULE | Refills: 3 | Status: SHIPPED | OUTPATIENT
Start: 2024-03-19

## 2024-03-19 RX ORDER — LISINOPRIL AND HYDROCHLOROTHIAZIDE 12.5; 1 MG/1; MG/1
2 TABLET ORAL DAILY
Qty: 180 TABLET | Refills: 3 | Status: SHIPPED | OUTPATIENT
Start: 2024-03-19

## 2024-03-19 NOTE — PROGRESS NOTES
"Chief Complaint  Diabetes (3 months follow up diabetes no complains ), Hypertension (Doinmg well no complains needs refills ), and Hypothyroidism (Doing well no comoplains )    Subjective        Tatiana Turner presents to [facility]  History of Present Illness    Diabetes well controlled, some stress with her  changing his shift.  She does have less of an appetite, but she is is over eating with being stressed but she does feel that the Ozempic has been helpful to control her appetite.  Not exercising quite as much.  In terms of hypothyroidism doing well on levothyroxine with no adverse effects.  Her breathing is good, feeling great, needing refill of some of her routine meds.    Objective   Vital Signs:  /76   Pulse 75   Temp 97.6 °F (36.4 °C)   Ht 157.5 cm (62\")   Wt 99.3 kg (219 lb)   SpO2 98%   BMI 40.06 kg/m²   Estimated body mass index is 40.06 kg/m² as calculated from the following:    Height as of this encounter: 157.5 cm (62\").    Weight as of this encounter: 99.3 kg (219 lb).       Physical Exam  Vitals and nursing note reviewed.   Constitutional:       General: She is not in acute distress.     Appearance: She is well-developed.   HENT:      Head: Normocephalic.      Nose: Nose normal.   Eyes:      General: No scleral icterus.  Pulmonary:      Effort: Pulmonary effort is normal. No respiratory distress.   Musculoskeletal:         General: Normal range of motion.   Skin:     General: Skin is warm and dry.      Findings: No rash.   Neurological:      Mental Status: She is alert and oriented to person, place, and time.   Psychiatric:         Behavior: Behavior normal.         Thought Content: Thought content normal.         Judgment: Judgment normal.        Result Review :    The following data was reviewed by: Dawna Jaime MD on 03/19/2024:  A1C Last 3 Results          9/7/2023    12:45 12/1/2023    10:33 3/19/2024    11:21   HGBA1C Last 3 Results "   Hemoglobin A1C 6.4  6.2  6.0                   Assessment and Plan     Diagnoses and all orders for this visit:    1. Type 2 diabetes mellitus with diabetic polyneuropathy, without long-term current use of insulin (Primary)  -     POC Glycosylated Hemoglobin (Hb A1C)  -     Semaglutide, 2 MG/DOSE, (OZEMPIC) 8 MG/3ML solution pen-injector; Inject 2 mg under the skin into the appropriate area as directed 1 (One) Time Per Week.  Dispense: 9 mL; Refill: 1  -     Vitamin B12; Future  -     Vitamin D,25-Hydroxy; Future  -     Lipase; Future  -     Lipid Panel; Future  -     Hemoglobin A1c; Future  -     TSH Rfx On Abnormal To Free T4; Future  -     Comprehensive Metabolic Panel; Future  -     CBC & Differential; Future    2. Acquired hypothyroidism  -     levothyroxine (SYNTHROID, LEVOTHROID) 125 MCG tablet; Take 1 tablet by mouth Daily.  Dispense: 90 tablet; Refill: 3  -     Vitamin B12; Future  -     Vitamin D,25-Hydroxy; Future  -     Lipase; Future  -     Lipid Panel; Future  -     Hemoglobin A1c; Future  -     TSH Rfx On Abnormal To Free T4; Future  -     Comprehensive Metabolic Panel; Future  -     CBC & Differential; Future    3. Essential hypertension  -     lisinopril-hydrochlorothiazide (PRINZIDE,ZESTORETIC) 10-12.5 MG per tablet; Take 2 tablets by mouth Daily.  Dispense: 180 tablet; Refill: 3  -     Vitamin B12; Future  -     Vitamin D,25-Hydroxy; Future  -     Lipase; Future  -     Lipid Panel; Future  -     Hemoglobin A1c; Future  -     TSH Rfx On Abnormal To Free T4; Future  -     Comprehensive Metabolic Panel; Future  -     CBC & Differential; Future    4. Mixed hyperlipidemia  -     omega-3 acid ethyl esters (LOVAZA) 1 g capsule; TAKE 2 CAPSULES BY MOUTH TWICE  DAILY  Dispense: 360 capsule; Refill: 3  -     Vitamin B12; Future  -     Vitamin D,25-Hydroxy; Future  -     Lipase; Future  -     Lipid Panel; Future  -     Hemoglobin A1c; Future  -     TSH Rfx On Abnormal To Free T4; Future  -      "Comprehensive Metabolic Panel; Future  -     CBC & Differential; Future    5. Simple chronic bronchitis  -     budesonide-formoterol (SYMBICORT) 160-4.5 MCG/ACT inhaler; Inhale 2 puffs 2 (Two) Times a Day.  Dispense: 10.2 g; Refill: 2    Very pleasant 63-year-old female to follow-up for type 2 diabetes essentially very well-controlled, we discussed portion control and choosing foods that are more nutrient dense as a way to improve her diabetes and also weight loss, we also measured inches as a way to measure her progress.  Waist: 47 \",   arms: 15\" right, 16\" left;   thigh: 19\" right 20\" left    Hypothyroidism and remaining problems are well-controlled, not having adverse effects to meds.  Due for blood work with next visit, refilled as above.         Follow Up     Return in about 3 months (around 6/19/2024), or if symptoms worsen or fail to improve, for Annual Physical with fasting labs prior.  Patient was given instructions and counseling regarding her condition or for health maintenance advice. Please see specific information pulled into the AVS if appropriate.         "

## 2024-03-22 ENCOUNTER — TELEPHONE (OUTPATIENT)
Dept: FAMILY MEDICINE CLINIC | Facility: CLINIC | Age: 64
End: 2024-03-22
Payer: COMMERCIAL

## 2024-03-22 NOTE — TELEPHONE ENCOUNTER
Caller: Tatiana Turner    Relationship: Self    Best call back number:     [phone] (Mobile)       What is the best time to reach you: ANY     Who are you requesting to speak with (clinical staff, provider,  specific staff member): CLINICAL     Do you know the name of the person who called: N/A     What was the call regarding: PATIENT WANTED TO CONFIRM WHICH TYPE OF INHALER SHE IS SUPPOSED TO BE USING. SHE STATES FOR THE LAST YEAR OR SO SHE HAS BEEN USING ADVAIR AND THEN WHEN SHE PICKED UP HER MEDICATION THIS TIME IT WAS SYMBICORT. SHE STATES SHE IS NOT SURE IF THIS WAS INTENTIONAL.     Is it okay if the provider responds through MyChart:YES

## 2024-03-22 NOTE — TELEPHONE ENCOUNTER
The Symbicort inhaler was on her chart so that was the inhaler I thought she was already taking.  There are changes to insurance that can dictate which inhaler is preferred as well.  Both of them are the same thing so I would recommend to use the inhaler she has unless she finds that it is less helpful.  We can change it back to Advair.

## 2024-04-04 DIAGNOSIS — I65.23 BILATERAL CAROTID ARTERY STENOSIS: Primary | ICD-10-CM

## 2024-06-28 ENCOUNTER — OFFICE VISIT (OUTPATIENT)
Dept: FAMILY MEDICINE CLINIC | Facility: CLINIC | Age: 64
End: 2024-06-28
Payer: COMMERCIAL

## 2024-06-28 VITALS
HEIGHT: 62 IN | WEIGHT: 218 LBS | DIASTOLIC BLOOD PRESSURE: 82 MMHG | SYSTOLIC BLOOD PRESSURE: 124 MMHG | TEMPERATURE: 97.6 F | BODY MASS INDEX: 40.12 KG/M2 | OXYGEN SATURATION: 98 % | HEART RATE: 81 BPM

## 2024-06-28 DIAGNOSIS — E11.42 TYPE 2 DIABETES MELLITUS WITH DIABETIC POLYNEUROPATHY, WITHOUT LONG-TERM CURRENT USE OF INSULIN: ICD-10-CM

## 2024-06-28 DIAGNOSIS — K21.9 GASTROESOPHAGEAL REFLUX DISEASE, UNSPECIFIED WHETHER ESOPHAGITIS PRESENT: ICD-10-CM

## 2024-06-28 DIAGNOSIS — R74.8 ELEVATED LIPASE: ICD-10-CM

## 2024-06-28 DIAGNOSIS — Z00.00 HEALTH MAINTENANCE EXAMINATION: Primary | ICD-10-CM

## 2024-06-28 PROCEDURE — 99396 PREV VISIT EST AGE 40-64: CPT | Performed by: FAMILY MEDICINE

## 2024-06-28 RX ORDER — FAMOTIDINE 40 MG/1
40 TABLET, FILM COATED ORAL DAILY PRN
Qty: 90 TABLET | Refills: 3 | Status: SHIPPED | OUTPATIENT
Start: 2024-06-28

## 2024-06-28 NOTE — PROGRESS NOTES
"Chief Complaint  Annual Exam (Doing well ,no complains had labs prior )    Subjective        Tatiana Turner presents to [facility]  History of Present Illness  Annual Exam.    Health Maintenance   Topic Date Due    ZOSTER VACCINE (1 of 2) Never done    URINE MICROALBUMIN  09/13/2017    RSV Vaccine - Adults (1 - 1-dose 60+ series) Never done    COVID-19 Vaccine (3 - 2023-24 season) 09/01/2023    COLORECTAL CANCER SCREENING  09/08/2023    ANNUAL PHYSICAL  05/26/2024    DIABETIC EYE EXAM  08/04/2024    INFLUENZA VACCINE  08/01/2024    PAP SMEAR  12/01/2024    MAMMOGRAM  12/13/2024    DIABETIC FOOT EXAM  12/18/2024    HEMOGLOBIN A1C  12/19/2024    LUNG CANCER SCREENING  01/08/2025    BMI FOLLOWUP  03/19/2025    LIPID PANEL  06/19/2025    TDAP/TD VACCINES (2 - Td or Tdap) 05/26/2033    HEPATITIS C SCREENING  Completed    Pneumococcal Vaccine 0-64  Completed     Diet: diet is OK, ate more seafood in [place] and doing better, life has been stressful.   Exercise: exercising some, doing some classes but not as much as she was anand she has been busy  GYN: Has apt in Dec   Immunizations: discussed  Colon cancer screening: she had the paperwork and forgot   Sleep/mental health: no depression some anxiety but it has been really stressful.  Not daily  Dentist:  UTD   Eye exam UTD - due in August   Derm: no concerns - apt in July     Objective   Vital Signs:  /82   Pulse 81   Temp 97.6 °F (36.4 °C)   Ht 157.5 cm (62\")   Wt 98.9 kg (218 lb)   SpO2 98%   BMI 39.87 kg/m²   Estimated body mass index is 39.87 kg/m² as calculated from the following:    Height as of this encounter: 157.5 cm (62\").    Weight as of this encounter: 98.9 kg (218 lb).         Physical Exam  Vitals and nursing note reviewed.   Constitutional:       General: She is not in acute distress.     Appearance: She is well-developed. She is obese.   HENT:      Head: Normocephalic.      Right Ear: Tympanic membrane, ear canal " and external ear normal. There is no impacted cerumen.      Left Ear: Tympanic membrane, ear canal and external ear normal. There is no impacted cerumen.      Nose: Nose normal.   Eyes:      Extraocular Movements: Extraocular movements intact.      Conjunctiva/sclera: Conjunctivae normal.   Neck:      Vascular: Carotid bruit present.   Cardiovascular:      Rate and Rhythm: Normal rate and regular rhythm.      Heart sounds: Normal heart sounds. No murmur heard.  Pulmonary:      Effort: Pulmonary effort is normal. No respiratory distress.      Breath sounds: Normal breath sounds.   Abdominal:      General: There is no distension.      Palpations: Abdomen is soft. There is no mass.      Tenderness: There is no abdominal tenderness. There is no guarding.   Musculoskeletal:         General: Normal range of motion.   Lymphadenopathy:      Cervical: No cervical adenopathy.   Skin:     General: Skin is warm and dry.      Findings: No rash.   Neurological:      Mental Status: She is alert and oriented to person, place, and time.   Psychiatric:         Behavior: Behavior normal.         Thought Content: Thought content normal.         Judgment: Judgment normal.        Result Review :    The following data was reviewed by: Dawna Jaime MD on 06/28/2024:      CBC & Differential (06/19/2024 10:35)  Comprehensive Metabolic Panel (06/19/2024 10:35)  TSH Rfx On Abnormal To Free T4 (06/19/2024 10:35)  Hemoglobin A1c (06/19/2024 10:35)  Lipid Panel (06/19/2024 10:35)  Lipase (06/19/2024 10:35)  Vitamin D,25-Hydroxy (06/19/2024 10:35)  Vitamin B12 (06/19/2024 10:35)           Assessment and Plan     Diagnoses and all orders for this visit:    1. Health maintenance examination (Primary)    2. Gastroesophageal reflux disease, unspecified whether esophagitis present  -     famotidine (PEPCID) 40 MG tablet; Take 1 tablet by mouth Daily As Needed for Heartburn.  Dispense: 90 tablet; Refill: 3    3. Elevated lipase  -     CBC &  Differential; Future  -     Lipase; Future    4. Type 2 diabetes mellitus with diabetic polyneuropathy, without long-term current use of insulin  -     Comprehensive Metabolic Panel; Future  -     CBC & Differential; Future  -     Hemoglobin A1c; Future  -     Microalbumin / Creatinine Urine Ratio - Urine, Clean Catch; Future    Here for annual exam, fasting labs reviewed with patient, discussed elevated lipase and pancreas enzymes, discussed limiting alcohol and fatty foods especially, immunizations up-to-date, colon cancer screening already ordered, patient has paperwork but needs to submit them to schedule, GYN health has an appointment in August, cardiovascular screening negative for symptoms, carotid ultrasound with vascular coming up soon, counseled patient to increase vegetable intake, water and 150 minutes of exercise weekly combining weightbearing exercises and aerobic activity.           Follow Up     Return in about 3 months (around 9/28/2024), or if symptoms worsen or fail to improve, for Recheck DM with labs prior .  Patient was given instructions and counseling regarding her condition or for health maintenance advice. Please see specific information pulled into the AVS if appropriate.

## 2024-08-18 DIAGNOSIS — E11.42 TYPE 2 DIABETES MELLITUS WITH DIABETIC POLYNEUROPATHY, WITHOUT LONG-TERM CURRENT USE OF INSULIN: ICD-10-CM

## 2024-08-19 RX ORDER — SEMAGLUTIDE 2.68 MG/ML
INJECTION, SOLUTION SUBCUTANEOUS
Qty: 9 ML | Refills: 3 | Status: SHIPPED | OUTPATIENT
Start: 2024-08-19

## 2024-09-19 ENCOUNTER — OFFICE VISIT (OUTPATIENT)
Dept: FAMILY MEDICINE CLINIC | Facility: CLINIC | Age: 64
End: 2024-09-19
Payer: COMMERCIAL

## 2024-09-19 VITALS
WEIGHT: 219 LBS | OXYGEN SATURATION: 95 % | HEIGHT: 62 IN | TEMPERATURE: 97.8 F | DIASTOLIC BLOOD PRESSURE: 76 MMHG | HEART RATE: 89 BPM | BODY MASS INDEX: 40.3 KG/M2 | SYSTOLIC BLOOD PRESSURE: 144 MMHG

## 2024-09-19 DIAGNOSIS — M54.16 LUMBAR RADICULOPATHY: Primary | ICD-10-CM

## 2024-09-19 PROCEDURE — 96372 THER/PROPH/DIAG INJ SC/IM: CPT | Performed by: FAMILY MEDICINE

## 2024-09-19 PROCEDURE — 99213 OFFICE O/P EST LOW 20 MIN: CPT | Performed by: FAMILY MEDICINE

## 2024-09-19 RX ORDER — CYCLOBENZAPRINE HCL 5 MG
5 TABLET ORAL 3 TIMES DAILY PRN
Qty: 30 TABLET | Refills: 2 | Status: SHIPPED | OUTPATIENT
Start: 2024-09-19

## 2024-09-19 RX ORDER — METHYLPREDNISOLONE SODIUM SUCCINATE 40 MG/ML
40 INJECTION, POWDER, LYOPHILIZED, FOR SOLUTION INTRAMUSCULAR; INTRAVENOUS ONCE
Status: COMPLETED | OUTPATIENT
Start: 2024-09-19 | End: 2024-09-19

## 2024-09-19 RX ORDER — LIDOCAINE 50 MG/G
1 PATCH TOPICAL EVERY 24 HOURS
Qty: 30 EACH | Refills: 2 | Status: SHIPPED | OUTPATIENT
Start: 2024-09-19

## 2024-09-19 RX ADMIN — METHYLPREDNISOLONE SODIUM SUCCINATE 40 MG: 40 INJECTION, POWDER, LYOPHILIZED, FOR SOLUTION INTRAMUSCULAR; INTRAVENOUS at 15:28

## 2024-09-24 ENCOUNTER — TELEPHONE (OUTPATIENT)
Dept: FAMILY MEDICINE CLINIC | Facility: CLINIC | Age: 64
End: 2024-09-24
Payer: COMMERCIAL

## 2024-10-15 RX ORDER — PITAVASTATIN CALCIUM 4.18 MG/1
1 TABLET, FILM COATED ORAL DAILY
Qty: 90 TABLET | Refills: 3 | Status: SHIPPED | OUTPATIENT
Start: 2024-10-15

## 2024-10-22 DIAGNOSIS — J41.0 SIMPLE CHRONIC BRONCHITIS: ICD-10-CM

## 2024-10-22 RX ORDER — BUDESONIDE AND FORMOTEROL FUMARATE DIHYDRATE 160; 4.5 UG/1; UG/1
AEROSOL RESPIRATORY (INHALATION)
Qty: 30.6 G | Refills: 3 | Status: SHIPPED | OUTPATIENT
Start: 2024-10-22

## 2024-11-01 ENCOUNTER — OFFICE VISIT (OUTPATIENT)
Dept: FAMILY MEDICINE CLINIC | Facility: CLINIC | Age: 64
End: 2024-11-01
Payer: COMMERCIAL

## 2024-11-01 VITALS
WEIGHT: 220 LBS | SYSTOLIC BLOOD PRESSURE: 122 MMHG | TEMPERATURE: 97.5 F | DIASTOLIC BLOOD PRESSURE: 76 MMHG | HEART RATE: 61 BPM | OXYGEN SATURATION: 98 % | BODY MASS INDEX: 40.48 KG/M2 | HEIGHT: 62 IN

## 2024-11-01 DIAGNOSIS — E11.42 TYPE 2 DIABETES MELLITUS WITH DIABETIC POLYNEUROPATHY, WITHOUT LONG-TERM CURRENT USE OF INSULIN: Primary | ICD-10-CM

## 2024-11-01 DIAGNOSIS — E66.01 CLASS 3 SEVERE OBESITY DUE TO EXCESS CALORIES WITH SERIOUS COMORBIDITY AND BODY MASS INDEX (BMI) OF 40.0 TO 44.9 IN ADULT: ICD-10-CM

## 2024-11-01 DIAGNOSIS — M43.17 SPONDYLOLISTHESIS OF LUMBOSACRAL REGION: ICD-10-CM

## 2024-11-01 DIAGNOSIS — R74.8 ELEVATED LIPASE: ICD-10-CM

## 2024-11-01 DIAGNOSIS — Z23 NEED FOR VACCINATION: ICD-10-CM

## 2024-11-01 DIAGNOSIS — E66.813 CLASS 3 SEVERE OBESITY DUE TO EXCESS CALORIES WITH SERIOUS COMORBIDITY AND BODY MASS INDEX (BMI) OF 40.0 TO 44.9 IN ADULT: ICD-10-CM

## 2024-11-01 DIAGNOSIS — M54.16 LUMBAR RADICULOPATHY: ICD-10-CM

## 2024-11-01 NOTE — PROGRESS NOTES
"Chief Complaint  Diabetes (Follow up with labs no complains doing well  ) and Weight Check (Doing well working on diet trying to move move more )    Subjective        Tatiana Turner presents to [facility]  Diabetes        Pleasant 64-year-old female here to follow-up for type 2 diabetes and obesity which is not well controlled.  She is currently on Ozempic 2 mg daily which has been very helpful for her blood sugars.  Lipase levels have been mildly elevated.    Lower back pain: she has been laying low with exercises and has been having improvement with physical therapy, she did feel like the steroid shot was extremely helpful, she only needed 1 muscle relaxer and a couple days of the NSAID.  While she does like the PT has been very helpful her symptoms have not completely resolved and was hoping to continue with a few more sessions to see if this would optimize her treatment.    Objective   Vital Signs:  /76   Pulse 61   Temp 97.5 °F (36.4 °C)   Ht 157.5 cm (62\")   Wt 99.8 kg (220 lb)   SpO2 98%   BMI 40.24 kg/m²   Estimated body mass index is 40.24 kg/m² as calculated from the following:    Height as of this encounter: 157.5 cm (62\").    Weight as of this encounter: 99.8 kg (220 lb).            Physical Exam  Vitals and nursing note reviewed.   Constitutional:       General: She is not in acute distress.     Appearance: She is well-developed.   HENT:      Head: Normocephalic.      Nose: Nose normal.   Cardiovascular:      Rate and Rhythm: Normal rate and regular rhythm.      Heart sounds: Normal heart sounds. No murmur heard.  Pulmonary:      Effort: Pulmonary effort is normal. No respiratory distress.      Breath sounds: Normal breath sounds.   Musculoskeletal:         General: Normal range of motion.   Skin:     General: Skin is warm and dry.      Findings: No rash.   Neurological:      Mental Status: She is alert and oriented to person, place, and time.   Psychiatric: "         Behavior: Behavior normal.         Thought Content: Thought content normal.         Judgment: Judgment normal.        Result Review :  The following data was reviewed by: Dawna Jaime MD on 11/01/2024:         Microalbumin / Creatinine Urine Ratio - Urine, Clean Catch (10/28/2024 09:32)  Lipase (10/28/2024 09:29)  Hemoglobin A1c (10/28/2024 09:29)  CBC & Differential (10/28/2024 09:29)  Comprehensive Metabolic Panel (10/28/2024 09:29)     Assessment and Plan   Diagnoses and all orders for this visit:    1. Type 2 diabetes mellitus with diabetic polyneuropathy, without long-term current use of insulin (Primary)  -     US Abdomen Limited; Future    2. Elevated lipase  -     US Abdomen Limited; Future    3. Need for vaccination  -     Fluzone >6mos ([id number])    4. Class 3 severe obesity due to excess calories with serious comorbidity and body mass index (BMI) of 40.0 to 44.9 in adult    5. Lumbar radiculopathy  -     Ambulatory Referral to Physical Therapy for Evaluation & Treatment    6. Spondylolisthesis of lumbosacral region  -     Ambulatory Referral to Physical Therapy for Evaluation & Treatment    Pleasant 64-year-old female here for type 2 diabetes that is well-controlled, she is on Ozempic 2 mg and doing well overall but her labs are showing a mildly elevated lipase level, she is doing her best to eat low-fat foods, will get ultrasound as above, continue with Ozempic for now and follow-up in 3 months.    Chronic lower back pain that thankfully is responding well to physical therapy, steroids and muscle relaxers and some NSAIDs.  However not completely resolved, requesting a few more sessions of PT to see if it will completely resolve her pain.  Discussed if not she should consider an MRI and potentially even evaluation with pain management.  She responded so quickly to steroid she might be a good epidural candidate.         Follow Up   Return in about 3 months (around 2/1/2025), or if symptoms  worsen or fail to improve, for Recheck DM.  Patient was given instructions and counseling regarding her condition or for health maintenance advice. Please see specific information pulled into the AVS if appropriate.

## 2024-11-14 ENCOUNTER — HOSPITAL ENCOUNTER (OUTPATIENT)
Dept: ULTRASOUND IMAGING | Facility: HOSPITAL | Age: 64
Discharge: HOME | End: 2024-11-14
Admitting: FAMILY MEDICINE
Payer: COMMERCIAL

## 2024-11-14 DIAGNOSIS — R74.8 ELEVATED LIPASE: ICD-10-CM

## 2024-11-14 DIAGNOSIS — E11.42 TYPE 2 DIABETES MELLITUS WITH DIABETIC POLYNEUROPATHY, WITHOUT LONG-TERM CURRENT USE OF INSULIN: ICD-10-CM

## 2024-11-14 PROCEDURE — 76705 ECHO EXAM OF ABDOMEN: CPT

## 2024-11-15 NOTE — PROGRESS NOTES
Please call patient back with results.  The ultrasound of the liver has resulted as fatty liver disease, no gallstones or other findings, do think weight loss really will be key to improve the health of the liver otherwise there could be risk of cirrhosis or liver damage that is ongoing.  Please let us know if you have further questions.     Thank you

## 2024-12-09 ENCOUNTER — HOSPITAL ENCOUNTER (OUTPATIENT)
Facility: HOSPITAL | Age: 64
Discharge: HOME | End: 2024-12-09
Admitting: NURSE PRACTITIONER
Payer: COMMERCIAL

## 2024-12-09 ENCOUNTER — OFFICE VISIT (OUTPATIENT)
Age: 64
End: 2024-12-09
Payer: COMMERCIAL

## 2024-12-09 VITALS
HEIGHT: 62 IN | BODY MASS INDEX: 40.48 KG/M2 | SYSTOLIC BLOOD PRESSURE: 152 MMHG | WEIGHT: 220 LBS | DIASTOLIC BLOOD PRESSURE: 90 MMHG

## 2024-12-09 DIAGNOSIS — I65.23 BILATERAL CAROTID ARTERY STENOSIS: ICD-10-CM

## 2024-12-09 DIAGNOSIS — I65.23 CAROTID STENOSIS, BILATERAL: Primary | ICD-10-CM

## 2024-12-09 LAB
BH CV XLRA MEAS LEFT CAROTID BULB EDV: -39.9 CM/SEC
BH CV XLRA MEAS LEFT CAROTID BULB PSV: -117 CM/SEC
BH CV XLRA MEAS LEFT DIST CCA EDV: -23.8 CM/SEC
BH CV XLRA MEAS LEFT DIST CCA PSV: -67.3 CM/SEC
BH CV XLRA MEAS LEFT DIST ICA EDV: -50.3 CM/SEC
BH CV XLRA MEAS LEFT DIST ICA PSV: -118.7 CM/SEC
BH CV XLRA MEAS LEFT ICA/CCA RATIO: 1.76
BH CV XLRA MEAS LEFT MID CCA EDV: 35.8 CM/SEC
BH CV XLRA MEAS LEFT MID CCA PSV: 103.6 CM/SEC
BH CV XLRA MEAS LEFT MID ICA EDV: -43.3 CM/SEC
BH CV XLRA MEAS LEFT MID ICA PSV: -104 CM/SEC
BH CV XLRA MEAS LEFT PROX CCA EDV: 30 CM/SEC
BH CV XLRA MEAS LEFT PROX CCA PSV: 110.4 CM/SEC
BH CV XLRA MEAS LEFT PROX ECA EDV: 42.6 CM/SEC
BH CV XLRA MEAS LEFT PROX ECA PSV: 246 CM/SEC
BH CV XLRA MEAS LEFT PROX ICA EDV: -35.5 CM/SEC
BH CV XLRA MEAS LEFT PROX ICA PSV: -97.9 CM/SEC
BH CV XLRA MEAS LEFT PROX SCLA PSV: 229.1 CM/SEC
BH CV XLRA MEAS LEFT VERTEBRAL A EDV: 28.6 CM/SEC
BH CV XLRA MEAS LEFT VERTEBRAL A PSV: 81.4 CM/SEC
BH CV XLRA MEAS RIGHT CAROTID BULB PSV: 0 CM/SEC
BH CV XLRA MEAS RIGHT DIST CCA EDV: -11.2 CM/SEC
BH CV XLRA MEAS RIGHT DIST CCA PSV: -74.3 CM/SEC
BH CV XLRA MEAS RIGHT DIST ICA PSV: 0 CM/SEC
BH CV XLRA MEAS RIGHT MID CCA EDV: -11.9 CM/SEC
BH CV XLRA MEAS RIGHT MID CCA PSV: -84.8 CM/SEC
BH CV XLRA MEAS RIGHT MID ICA PSV: 0 CM/SEC
BH CV XLRA MEAS RIGHT PROX CCA EDV: 12.1 CM/SEC
BH CV XLRA MEAS RIGHT PROX CCA PSV: 119.6 CM/SEC
BH CV XLRA MEAS RIGHT PROX ECA EDV: 18 CM/SEC
BH CV XLRA MEAS RIGHT PROX ECA PSV: 118.4 CM/SEC
BH CV XLRA MEAS RIGHT PROX ICA PSV: 0 CM/SEC
BH CV XLRA MEAS RIGHT PROX SCLA PSV: 375.7 CM/SEC
BH CV XLRA MEAS RIGHT VERTEBRAL A EDV: -16.8 CM/SEC
BH CV XLRA MEAS RIGHT VERTEBRAL A PSV: -49.1 CM/SEC
LEFT ARM BP: NORMAL MMHG
RIGHT ARM BP: NORMAL MMHG

## 2024-12-09 PROCEDURE — 93880 EXTRACRANIAL BILAT STUDY: CPT | Performed by: SURGERY

## 2024-12-09 PROCEDURE — 93880 EXTRACRANIAL BILAT STUDY: CPT

## 2024-12-09 PROCEDURE — 99213 OFFICE O/P EST LOW 20 MIN: CPT | Performed by: NURSE PRACTITIONER

## 2024-12-09 NOTE — PROGRESS NOTES
Chief Complaint  Carotid Artery Disease    Subjective        Tatiana Turner presents to [facility] V[facility]ULAR SURGERY  HPI   Tatiana Turner is a 64 y.o. female that has been followed in our office by Dr. Mcbride for carotid artery stenosis.  She has a known occluded right internal carotid artery.  She returns today in follow up along with a carotid duplex. She  reports she has been doing well without hospitalizations or surgeries. She denies any symptoms consistent with CVA, TIA, or amaurosis fugax.     Review of Systems   Constitutional:  Negative for fever.   Eyes:  Negative for visual disturbance.   Cardiovascular:  Negative for leg swelling.   Gastrointestinal:  Negative for abdominal pain.   Musculoskeletal:  Negative for back pain.   Skin:  Negative for color change, pallor and wound.   Neurological:  Negative for dizziness, facial asymmetry, speech difficulty and weakness.        Tatiana Turner  reports that she quit smoking about 14 years ago. Her smoking use included cigarettes. She started smoking about 46 years ago. She has a 32 pack-year smoking history. She has been exposed to tobacco smoke. She has never used smokeless tobacco..        Objective   Vital Signs:  Vitals:    12/09/24 1213   BP: 152/90      Body mass index is 40.24 kg/m².           Physical Exam  Vitals reviewed.   Constitutional:       Appearance: Normal appearance.   HENT:      Head: Normocephalic.   Cardiovascular:      Rate and Rhythm: Normal rate and regular rhythm.      Pulses: Normal pulses.           Dorsalis pedis pulses are 3+ on the right side and 3+ on the left side.        Posterior tibial pulses are 3+ on the right side and 3+ on the left side.   Pulmonary:      Effort: Pulmonary effort is normal.   Skin:     General: Skin is warm.   Neurological:      General: No focal deficit present.      Mental Status: She is alert and oriented to person, place, and time.   Psychiatric:         Mood and Affect:  Mood normal.          Result Review :      Previous carotid duplex: Occlusion on the right and less than 50% stenosis on the left    Carotid duplex from today: Duplex Carotid Ultrasound CAR (12/09/2024 12:09)                    Assessment and Plan     Diagnoses and all orders for this visit:    1. Carotid stenosis, bilateral (Primary)  -     Duplex Carotid Ultrasound CAR; Future             Patient present today for follow up of carotid artery stenosis. She is to continue her antiplatelet agent which is aspirin. She is on a statin for cholesterol control.  We discussed adequate blood pressure control. She will return in 1 year along with a repeat carotid artery duplex.    Follow Up     Return in about 1 year (around 12/9/2025) for carotid duplex.  Patient was given instructions and counseling regarding her condition or for health maintenance advice. Please see specific information pulled into the AVS if appropriate.     JOSE Mackenzie

## 2025-02-04 ENCOUNTER — OFFICE VISIT (OUTPATIENT)
Dept: FAMILY MEDICINE CLINIC | Facility: CLINIC | Age: 65
End: 2025-02-04
Payer: COMMERCIAL

## 2025-02-04 VITALS
OXYGEN SATURATION: 97 % | SYSTOLIC BLOOD PRESSURE: 128 MMHG | DIASTOLIC BLOOD PRESSURE: 82 MMHG | TEMPERATURE: 97.6 F | HEIGHT: 62 IN | BODY MASS INDEX: 40.48 KG/M2 | WEIGHT: 220 LBS | HEART RATE: 68 BPM

## 2025-02-04 DIAGNOSIS — G89.29 CHRONIC SI JOINT PAIN: ICD-10-CM

## 2025-02-04 DIAGNOSIS — M54.16 LUMBAR RADICULOPATHY: ICD-10-CM

## 2025-02-04 DIAGNOSIS — E11.42 TYPE 2 DIABETES MELLITUS WITH DIABETIC POLYNEUROPATHY, WITHOUT LONG-TERM CURRENT USE OF INSULIN: Primary | ICD-10-CM

## 2025-02-04 DIAGNOSIS — M53.3 CHRONIC SI JOINT PAIN: ICD-10-CM

## 2025-02-04 DIAGNOSIS — Z12.11 SCREEN FOR COLON CANCER: ICD-10-CM

## 2025-02-04 LAB
EXPIRATION DATE: ABNORMAL
HBA1C MFR BLD: 6 % (ref 4.5–5.7)
Lab: 678

## 2025-02-04 PROCEDURE — 99214 OFFICE O/P EST MOD 30 MIN: CPT | Performed by: FAMILY MEDICINE

## 2025-02-04 PROCEDURE — 83036 HEMOGLOBIN GLYCOSYLATED A1C: CPT | Performed by: FAMILY MEDICINE

## 2025-02-04 NOTE — PROGRESS NOTES
"Chief Complaint  Diabetes (3m follow up )    Subjective        Tatiana Turner presents to [facility]  Diabetes    Pleasant 64-year-old female for type 2 diabetes that is thankfully well-controlled on Ozempic 2 mg weekly, at the last appointment we increased her dose with improvement in her A1c, she has not had any change to her weight, but thankfully feeling well overall.    The back is still and issue, PT has helepded, the Tingling down her leg has ehlped and she is nervpus to know wgat to do .  She did eliptacal and dioes well, adnt hen yesterday she went on a walk and now she has L hip pain. She feels like it is hard for her o eneter into normal life.     Objective   Vital Signs:  /82   Pulse 68   Temp 97.6 °F (36.4 °C)   Ht 157.5 cm (62\")   Wt 99.8 kg (220 lb)   SpO2 97%   BMI 40.24 kg/m²   Estimated body mass index is 40.24 kg/m² as calculated from the following:    Height as of this encounter: 157.5 cm (62\").    Weight as of this encounter: 99.8 kg (220 lb).            Physical Exam  Vitals and nursing note reviewed.   Constitutional:       General: She is not in acute distress.     Appearance: She is well-developed.   HENT:      Head: Normocephalic.      Nose: Nose normal.   Cardiovascular:      Rate and Rhythm: Normal rate and regular rhythm.      Heart sounds: Normal heart sounds. No murmur heard.  Pulmonary:      Effort: Pulmonary effort is normal. No respiratory distress.      Breath sounds: Normal breath sounds.   Musculoskeletal:         General: Normal range of motion.      Comments: No tenderness to palpation of the lumbar spine but she does have tenderness along the SI joints, trochanteric bursa on the left side and in the glutes and paraspinal muscles.   Skin:     General: Skin is warm and dry.      Findings: No rash.   Neurological:      Mental Status: She is alert and oriented to person, place, and time.   Psychiatric:         Behavior: Behavior " normal.         Thought Content: Thought content normal.         Judgment: Judgment normal.        Result Review :  The following data was reviewed by: Dawna Jaime MD on 02/04/2025:  A1C Last 3 Results          6/19/2024    10:35 10/28/2024    09:29 2/4/2025    11:17   HGBA1C Last 3 Results   Hemoglobin A1C 5.90  6.20  6.0                Assessment and Plan   Diagnoses and all orders for this visit:    1. Type 2 diabetes mellitus with diabetic polyneuropathy, without long-term current use of insulin (Primary)  Assessment & Plan:  Diabetes is well controlled on current meds.  Asymptomatic.    She is trying to be more consistent with eating healthy and exercising.  She does tend to be on and off in terms of lifestyle changes.  We discussed the importance of consistency pacifically with not eating extra sweets and exercising regularly.      Complications: Numbness and tingling in the feet bilaterally.    Plan:  - Continue lifestyle management  -Continue with Ozempic, increase dose from 1 mg to 2 mg weekly.  -ACE inhibitor present  -She is on a statin, Livalo 4 mg daily, she has had myalgia with atorvastatin, rosuvastatin and ezetimibe.  -Advise starting B12.  She has stopped metformin.  -Follow-up in 3 months    Orders:  -     POC Glycosylated Hemoglobin (Hb A1C)    2. Lumbar radiculopathy  -     MRI Lumbar Spine Without Contrast; Future  -     Ambulatory Referral to Pain Management    3. Chronic SI joint pain  -     Ambulatory Referral to Pain Management    4. Screen for colon cancer  -     Ambulatory Referral For Screening Colonoscopy    Chronic lower back pain unfortunate is not well-controlled, she has failed conservative management including physical therapy, meds, heat with ongoing radiculopathy down the right side which is improving and pain along the SI joint.  Plan to get MRI as above and refer to pain management.  At the moment it seems that the SI joint pain may be more problematic than the lumbar  radiculopathy but it does seem to alternate.         Follow Up   Return in about 3 months (around 5/4/2025), or if symptoms worsen or fail to improve, for Recheck Diabetes with labs prior .  Patient was given instructions and counseling regarding her condition or for health maintenance advice. Please see specific information pulled into the AVS if appropriate.

## 2025-02-28 DIAGNOSIS — I10 ESSENTIAL HYPERTENSION: ICD-10-CM

## 2025-02-28 RX ORDER — LISINOPRIL AND HYDROCHLOROTHIAZIDE 10; 12.5 MG/1; MG/1
2 TABLET ORAL DAILY
Qty: 180 TABLET | Refills: 3 | Status: SHIPPED | OUTPATIENT
Start: 2025-02-28

## 2025-03-03 ENCOUNTER — HOSPITAL ENCOUNTER (OUTPATIENT)
Dept: MRI IMAGING | Facility: HOSPITAL | Age: 65
Discharge: HOME | End: 2025-03-03
Admitting: FAMILY MEDICINE
Payer: COMMERCIAL

## 2025-03-03 DIAGNOSIS — M54.16 LUMBAR RADICULOPATHY: ICD-10-CM

## 2025-03-03 PROCEDURE — 72148 MRI LUMBAR SPINE W/O DYE: CPT

## 2025-03-04 ENCOUNTER — PREP FOR SURGERY (OUTPATIENT)
Dept: OTHER | Facility: HOSPITAL | Age: 65
End: 2025-03-04
Payer: COMMERCIAL

## 2025-03-04 DIAGNOSIS — Z86.0100 HISTORY OF COLON POLYPS: Primary | ICD-10-CM

## 2025-03-07 NOTE — PROGRESS NOTES
Please verify patient has been notified by [employer], if not please call with results.  The MRI of the lumbar spine has resulted as showing some arthritis with slippage of the L5 vertebrae about 9 mm compared to S1, this is likely contributing to your symptoms, arthritis seen throughout the spine.  Continue follow-up with pain management as we discussed.  Please let us know if you have further questions.     Thank you

## 2025-03-25 ENCOUNTER — OFFICE VISIT (OUTPATIENT)
Dept: PAIN MEDICINE | Facility: CLINIC | Age: 65
End: 2025-03-25
Payer: COMMERCIAL

## 2025-03-25 VITALS
DIASTOLIC BLOOD PRESSURE: 95 MMHG | HEIGHT: 62 IN | RESPIRATION RATE: 18 BRPM | OXYGEN SATURATION: 100 % | WEIGHT: 222 LBS | TEMPERATURE: 96.9 F | SYSTOLIC BLOOD PRESSURE: 180 MMHG | BODY MASS INDEX: 40.85 KG/M2 | HEART RATE: 90 BPM

## 2025-03-25 DIAGNOSIS — M51.370 DEGENERATION OF INTERVERTEBRAL DISC OF LUMBOSACRAL REGION WITH DISCOGENIC BACK PAIN: ICD-10-CM

## 2025-03-25 DIAGNOSIS — M47.816 LUMBAR FACET ARTHROPATHY: Primary | ICD-10-CM

## 2025-03-25 DIAGNOSIS — M43.16 SPONDYLOLISTHESIS OF LUMBAR REGION: ICD-10-CM

## 2025-03-25 PROCEDURE — 99203 OFFICE O/P NEW LOW 30 MIN: CPT | Performed by: PHYSICIAN ASSISTANT

## 2025-03-25 NOTE — PROGRESS NOTES
"CHIEF COMPLAINT  Back pain  Low back pain \"its a tingling in my legs\"      Subjective   Tatiana Turner is a 64 y.o. female.   She presents to the office for initial evaluation of low back pain. She was referred here by Dr. Dawna Jiame.  This patient reports intermittent pain over numerous years however she had significant worsening of pain that initiated in October 2024 without injury or falls.  She states that the pain actually initiated with sudden onset of tingling starting at the lateral aspect of the hips and running into the lateral aspect of the thighs terminating at the knees.  She noted progressive worsening of the tingling after a month and then sought out medical care and was sent to physical therapy which was helpful.  Primary complaint at this time is pain in a bandlike distribution across the lumbar spine which is described as a continuous dull and aching sensation which is aggravated with standing, twisting, bending and cleaning.  Tingling is more intermittent and affects the lateral aspect of the thighs and is not on a continuous basis.    Patient denies any history of cervical or lumbar spine surgery.  She also denies any previous interventional pain management procedures.    This patient completed a 6-week course of physical therapy in October with moderate relief provided.  She continues with a physician guided HEP daily basis.  She is alternated with ice and heat therapy which also provide short-term relief.    Pain today 4/10 VAS in severity.      Back Pain  This is a new problem. The current episode started more than 1 month ago (October 2024). The problem occurs intermittently. The pain is present in the lumbar spine. The quality of the pain is described as aching (dull). Radiates to: bilateral hips/lateral thighs. The pain is at a severity of 4/10. The pain is moderate. The pain is The same all the time. The symptoms are aggravated by position, bending, twisting and standing " (cleaning/walking). Associated symptoms include numbness (legs tingle) and tingling (legs). Pertinent negatives include no abdominal pain, fever, headaches or weakness. Risk factors include obesity and sedentary lifestyle. She has tried ice, heat, home exercises and muscle relaxant for the symptoms. The treatment provided mild relief.        PEG Assessment   What number best describes your pain on average in the past week?5  What number best describes how, during the past week, pain has interfered with your enjoyment of life?6  What number best describes how, during the past week, pain has interfered with your general activity?  6        Current Outpatient Medications:     albuterol sulfate  (90 Base) MCG/ACT inhaler, Inhale 2 puffs Every 6 (Six) Hours As Needed., Disp: , Rfl:     aspirin 81 MG tablet, Take 1 tablet by mouth Daily., Disp: , Rfl:     Blood Glucose Monitoring Suppl (GNP True Metrix Glucose Meter) w/Device kit, 1 each As Needed (low or high blood sugar symptoms)., Disp: 1 kit, Rfl: 0    budesonide-formoterol (SYMBICORT) 160-4.5 MCG/ACT inhaler, USE 2 INHALATIONS BY MOUTH TWICE DAILY, Disp: 30.6 g, Rfl: 3    calcium citrate-vitamin D (CITRACAL+D) 315-200 MG-UNIT per tablet, Take 1 tablet by mouth Daily., Disp: , Rfl:     Cholecalciferol (VITAMIN D) 1000 UNITS capsule, Take by mouth. Take as directed, Disp: , Rfl:     cyclobenzaprine (FLEXERIL) 5 MG tablet, Take 1 tablet by mouth 3 (Three) Times a Day As Needed for Muscle Spasms (Pain)., Disp: 30 tablet, Rfl: 2    estradiol (VAGIFEM) 10 MCG tablet vaginal tablet, Insert into the vagina., Disp: , Rfl:     famotidine (PEPCID) 40 MG tablet, Take 1 tablet by mouth Daily As Needed for Heartburn., Disp: 90 tablet, Rfl: 3    fluticasone (FLONASE) 50 MCG/ACT nasal spray, Administer 2 sprays into the nostril(s) as directed by provider Daily., Disp: 15.8 g, Rfl: 0    glucose blood test strip, PRN high or low blood sugar, Disp: 100 each, Rfl: 1    Lancets  (accu-chek multiclix) lancets, PRN, Disp: 100 each, Rfl: 1    levothyroxine (SYNTHROID, LEVOTHROID) 125 MCG tablet, Take 1 tablet by mouth Daily., Disp: 90 tablet, Rfl: 3    lidocaine (LIDODERM) 5 %, Place 1 patch on the skin as directed by provider Daily. Remove & Discard patch within 12 hours or as directed by MD, Disp: 30 each, Rfl: 2    lisinopril-hydrochlorothiazide (PRINZIDE,ZESTORETIC) 10-12.5 MG per tablet, TAKE 2 TABLETS BY MOUTH DAILY, Disp: 180 tablet, Rfl: 3    nystatin (MYCOSTATIN) 249071 UNIT/GM cream, Apply 1 application  topically to the appropriate area as directed 2 (Two) Times a Day., Disp: 30 g, Rfl: 0    omega-3 acid ethyl esters (LOVAZA) 1 g capsule, TAKE 2 CAPSULES BY MOUTH TWICE  DAILY, Disp: 360 capsule, Rfl: 3    Ozempic, 2 MG/DOSE, 8 MG/3ML solution pen-injector, INJECT 2 MG SUBCUTANEOUSLY INTO  THE APPROPRIATE AREA AS DIRECTED ONCE WEEKLY, Disp: 9 mL, Rfl: 3    Pitavastatin Calcium 4 MG tablet, TAKE 1 TABLET BY MOUTH DAILY, Disp: 90 tablet, Rfl: 3    promethazine-dextromethorphan (PROMETHAZINE-DM) 6.25-15 MG/5ML syrup, Take 5 mL by mouth 4 (Four) Times a Day As Needed for Cough., Disp: 120 mL, Rfl: 0    The following portions of the patient's history were reviewed and updated as appropriate: allergies, current medications, past family history, past medical history, past social history, past surgical history, and problem list.      REVIEW OF PERTINENT MEDICAL DATA    MRI LUMBAR SPINE WO CONTRAST     Date of Exam: 3/3/2025 12:05 PM EST     Indication: lower back pain - worst on the R side.     Comparison: None available.     Technique:  Routine multiplanar/multisequence sequence images of the lumbar spine were obtained without contrast administration.          FINDINGS:  There is bilateral spondylolysis at the L5 level. There is associated spondylolisthesis of L5 measuring 9 mm. There is partial lumbarization of S1.     Degenerative endplate edema is noted at the L L5/S1 level. No  additional abnormal edema or stress related changes are seen. There is loss of disc space signal at multiple levels related to disc desiccation from age-related changes. No abnormal signal is   noted corresponding to the distal cord or nerve roots of the lumbar spine. The distal conus is identified at the L1/2 vertebral level.     The T12/L1 through L2/3 levels demonstrate no evidence for significant central canal narrowing or neural foraminal narrowing.     The L3/4 level demonstrates evidence for a mild broad-based disc bulge. This finding contributes to mild impression on the ventral thecal sac and encroachment on the lateral recesses. Mild hypertrophic posterior facet changes are noted. These findings   contribute to mild central canal narrowing. Mild bilateral neural foraminal narrowing is seen.     The L4/5 level demonstrates no significant posterior disc bulge or disc protrusion/extrusion. Moderate hypertrophic posterior facet changes are seen. There is ligamentum flavum hypertrophy. There is mild central canal narrowing. Mild bilateral neural   foraminal narrowing is seen.     As noted, there is anterolisthesis of L5 on S1. An associated moderate broad-based disc bulge is observed. These findings contribute to moderate impression on the ventral thecal sac and encroachment on the lateral recesses. Severe hypertrophic posterior   facet changes are noted. These findings contribute to mild central canal narrowing. Moderate bilateral neural foraminal narrowing is seen.     IMPRESSION:  1.Spondylolysis at the L5 level. There is associated spondylolisthesis of L5 measuring 9 mm.  2.Discogenic degenerative changes throughout the lower lumbar spine. These findings contribute to central canal narrowing and neural foraminal narrowing at multiple levels.  3.Degenerative hypertrophic posterior facet changes throughout the lower lumbar spine. These findings contribute to neural foraminal narrowing at multiple levels.       "     Electronically Signed: Grant Moore MD    3/3/2025 2:35 PM EST     Review of Systems   Constitutional:  Negative for activity change (less), fatigue and fever.   Respiratory:  Negative for cough and chest tightness.    Gastrointestinal:  Negative for abdominal pain, constipation and diarrhea.   Musculoskeletal:  Positive for back pain.   Neurological:  Positive for tingling (legs) and numbness (legs tingle). Negative for dizziness, weakness, light-headedness and headaches.   Psychiatric/Behavioral:  Negative for agitation, sleep disturbance and suicidal ideas. The patient is nervous/anxious (occ).        I have reviewed and confirmed the accuracy of the ROS as documented by the MA/LPN/RN CARLA Patel    Vitals:    03/25/25 0846 03/25/25 0852   BP: (!) 187/82 180/95   BP Location: Left arm Left arm   Patient Position: Sitting Sitting   Cuff Size: Adult Adult   Pulse: 90    Resp: 18    Temp: 96.9 °F (36.1 °C)    TempSrc: Temporal    SpO2: 100%    Weight: 101 kg (222 lb)    Height: 157.5 cm (62\")    PainSc: 4           Objective       Physical Exam  Vitals and nursing note reviewed.   Constitutional:       Appearance: Normal appearance. She is obese.      Comments: Very anxious   HENT:      Head: Normocephalic.   Pulmonary:      Effort: Pulmonary effort is normal.   Musculoskeletal:      Lumbar back: Spasms and tenderness (moderate pain to palpation over the bilateal lumbar facet joint spaces) present. Decreased range of motion (pain increases with lumbar extension).        Back:    Skin:     General: Skin is warm and dry.   Neurological:      General: No focal deficit present.      Mental Status: She is alert and oriented to person, place, and time.      Cranial Nerves: Cranial nerves 2-12 are intact.      Sensory: Sensation is intact.      Motor: Motor function is intact.      Gait: Gait is intact.      Deep Tendon Reflexes:      Reflex Scores:       Patellar reflexes are 2+ on the right side and 2+ on " the left side.       Achilles reflexes are 2+ on the right side and 2+ on the left side.  Psychiatric:         Mood and Affect: Mood normal.         Behavior: Behavior normal.         Thought Content: Thought content normal.         Judgment: Judgment normal.       PHQ-2 Depression Screening  Little interest or pleasure in doing things? Not at all   Feeling down, depressed, or hopeless? Not at all   PHQ-2 Total Score 0     Tobacco Use: Medium Risk (3/25/2025)    Patient History     Smoking Tobacco Use: Former     Smokeless Tobacco Use: Never     Passive Exposure: Past           Assessment & Plan   Diagnoses and all orders for this visit:    1. Lumbar facet arthropathy (Primary)    2. Spondylolisthesis of lumbar region    3. Degeneration of intervertebral disc of lumbosacral region with discogenic back pain        --- Tatiana Turner reports a pain score of 4.  Given her pain assessment as noted, treatment options were discussed and the following options were decided upon as a follow-up plan to address the patient's pain: continuation of current treatment plan for pain, patient not interested in pharmacological measures, and use of non-medical modalities (ice, heat, stretching and/or behavior modifications).    --- Follow-up in 6 weeks or sooner for further evaluation and treat recommendations to be made  --- Since the patient's primary pain complaint appears to be at axial low back pain I recommend consideration of diagnostic bilateral L4-S1 medial branch block under fluoroscopy guidance with plan to proceed to radiofrequency ablation with favorable response.  Patient education materials have been provided to the patient and she wishes to review it before deciding if she needs injective therapy.         Pain / Disability Scale    The scale used for measurement of pain and/or disability for this patient was the [place] back pain disability scale.  The score was 26 on 03/25/2025        FARHAT DOUGHERTY  report has been reviewed and scanned into the patient's chart.    As the clinician, I personally reviewed the FARHAT from 3/25/25 while the patient was in the office today.        Dictated utilizing Dragon dictation.

## 2025-04-28 DIAGNOSIS — E66.813 CLASS 3 SEVERE OBESITY DUE TO EXCESS CALORIES WITH SERIOUS COMORBIDITY AND BODY MASS INDEX (BMI) OF 40.0 TO 44.9 IN ADULT: ICD-10-CM

## 2025-04-28 DIAGNOSIS — E03.9 ACQUIRED HYPOTHYROIDISM: ICD-10-CM

## 2025-04-28 DIAGNOSIS — R74.8 ELEVATED LIPASE: ICD-10-CM

## 2025-04-28 DIAGNOSIS — E11.9 DIABETES MELLITUS WITHOUT COMPLICATION: ICD-10-CM

## 2025-04-28 DIAGNOSIS — I10 ESSENTIAL HYPERTENSION: ICD-10-CM

## 2025-04-28 DIAGNOSIS — E66.01 CLASS 3 SEVERE OBESITY DUE TO EXCESS CALORIES WITH SERIOUS COMORBIDITY AND BODY MASS INDEX (BMI) OF 40.0 TO 44.9 IN ADULT: ICD-10-CM

## 2025-04-28 DIAGNOSIS — E78.2 MIXED HYPERLIPIDEMIA: ICD-10-CM

## 2025-04-28 DIAGNOSIS — E11.42 TYPE 2 DIABETES MELLITUS WITH DIABETIC POLYNEUROPATHY, WITHOUT LONG-TERM CURRENT USE OF INSULIN: Primary | ICD-10-CM

## 2025-04-29 LAB
ALBUMIN SERPL-MCNC: 4.4 G/DL (ref 3.5–5.2)
ALBUMIN/GLOB SERPL: 1.7 G/DL
ALP SERPL-CCNC: 78 U/L (ref 39–117)
ALT SERPL-CCNC: 24 U/L (ref 1–33)
AST SERPL-CCNC: 25 U/L (ref 1–32)
BASOPHILS # BLD AUTO: 0.02 10*3/MM3 (ref 0–0.2)
BASOPHILS NFR BLD AUTO: 0.3 % (ref 0–1.5)
BILIRUB SERPL-MCNC: 0.6 MG/DL (ref 0–1.2)
BUN SERPL-MCNC: 15 MG/DL (ref 8–23)
BUN/CREAT SERPL: 22.7 (ref 7–25)
CALCIUM SERPL-MCNC: 10 MG/DL (ref 8.6–10.5)
CHLORIDE SERPL-SCNC: 96 MMOL/L (ref 98–107)
CHOLEST SERPL-MCNC: 204 MG/DL (ref 0–200)
CO2 SERPL-SCNC: 25.8 MMOL/L (ref 22–29)
CREAT SERPL-MCNC: 0.66 MG/DL (ref 0.57–1)
EGFRCR SERPLBLD CKD-EPI 2021: 99.3 ML/MIN/1.73
EOSINOPHIL # BLD AUTO: 0.09 10*3/MM3 (ref 0–0.4)
EOSINOPHIL NFR BLD AUTO: 1.4 % (ref 0.3–6.2)
ERYTHROCYTE [DISTWIDTH] IN BLOOD BY AUTOMATED COUNT: 12.6 % (ref 12.3–15.4)
GLOBULIN SER CALC-MCNC: 2.6 GM/DL
GLUCOSE SERPL-MCNC: 101 MG/DL (ref 65–99)
HBA1C MFR BLD: 6 % (ref 4.8–5.6)
HCT VFR BLD AUTO: 41.8 % (ref 34–46.6)
HDLC SERPL-MCNC: 72 MG/DL (ref 40–60)
HGB BLD-MCNC: 13.6 G/DL (ref 12–15.9)
IMM GRANULOCYTES # BLD AUTO: 0.02 10*3/MM3 (ref 0–0.05)
IMM GRANULOCYTES NFR BLD AUTO: 0.3 % (ref 0–0.5)
LDLC SERPL CALC-MCNC: 86 MG/DL (ref 0–100)
LDLC/HDLC SERPL: 1.06 {RATIO}
LIPASE SERPL-CCNC: 41 U/L (ref 13–60)
LYMPHOCYTES # BLD AUTO: 1.74 10*3/MM3 (ref 0.7–3.1)
LYMPHOCYTES NFR BLD AUTO: 27.9 % (ref 19.6–45.3)
MCH RBC QN AUTO: 29.8 PG (ref 26.6–33)
MCHC RBC AUTO-ENTMCNC: 32.5 G/DL (ref 31.5–35.7)
MCV RBC AUTO: 91.5 FL (ref 79–97)
MONOCYTES # BLD AUTO: 0.47 10*3/MM3 (ref 0.1–0.9)
MONOCYTES NFR BLD AUTO: 7.5 % (ref 5–12)
NEUTROPHILS # BLD AUTO: 3.89 10*3/MM3 (ref 1.7–7)
NEUTROPHILS NFR BLD AUTO: 62.6 % (ref 42.7–76)
NRBC BLD AUTO-RTO: 0 /100 WBC (ref 0–0.2)
PLATELET # BLD AUTO: 302 10*3/MM3 (ref 140–450)
POTASSIUM SERPL-SCNC: 4.2 MMOL/L (ref 3.5–5.2)
PROT SERPL-MCNC: 7 G/DL (ref 6–8.5)
RBC # BLD AUTO: 4.57 10*6/MM3 (ref 3.77–5.28)
SODIUM SERPL-SCNC: 137 MMOL/L (ref 136–145)
TRIGL SERPL-MCNC: 280 MG/DL (ref 0–150)
TSH SERPL DL<=0.005 MIU/L-ACNC: 1.6 UIU/ML (ref 0.27–4.2)
VLDLC SERPL CALC-MCNC: 46 MG/DL (ref 5–40)
WBC # BLD AUTO: 6.23 10*3/MM3 (ref 3.4–10.8)

## 2025-05-01 DIAGNOSIS — E03.9 ACQUIRED HYPOTHYROIDISM: ICD-10-CM

## 2025-05-01 RX ORDER — LEVOTHYROXINE SODIUM 125 UG/1
125 TABLET ORAL DAILY
Qty: 90 TABLET | Refills: 3 | Status: SHIPPED | OUTPATIENT
Start: 2025-05-01

## 2025-05-08 ENCOUNTER — OFFICE VISIT (OUTPATIENT)
Dept: FAMILY MEDICINE CLINIC | Facility: CLINIC | Age: 65
End: 2025-05-08
Payer: COMMERCIAL

## 2025-05-08 VITALS
BODY MASS INDEX: 40.48 KG/M2 | DIASTOLIC BLOOD PRESSURE: 68 MMHG | SYSTOLIC BLOOD PRESSURE: 110 MMHG | HEIGHT: 62 IN | TEMPERATURE: 97.6 F | OXYGEN SATURATION: 97 % | WEIGHT: 220 LBS | HEART RATE: 84 BPM

## 2025-05-08 DIAGNOSIS — E11.42 TYPE 2 DIABETES MELLITUS WITH DIABETIC POLYNEUROPATHY, WITHOUT LONG-TERM CURRENT USE OF INSULIN: Primary | ICD-10-CM

## 2025-05-08 DIAGNOSIS — E78.2 MIXED HYPERLIPIDEMIA: ICD-10-CM

## 2025-05-08 DIAGNOSIS — E11.9 DIABETES MELLITUS WITHOUT COMPLICATION: ICD-10-CM

## 2025-05-08 DIAGNOSIS — E03.9 ACQUIRED HYPOTHYROIDISM: ICD-10-CM

## 2025-05-08 DIAGNOSIS — E66.813 CLASS 3 SEVERE OBESITY DUE TO EXCESS CALORIES WITH SERIOUS COMORBIDITY AND BODY MASS INDEX (BMI) OF 40.0 TO 44.9 IN ADULT: ICD-10-CM

## 2025-05-08 DIAGNOSIS — R74.8 ELEVATED LIPASE: ICD-10-CM

## 2025-05-08 DIAGNOSIS — I10 ESSENTIAL HYPERTENSION: ICD-10-CM

## 2025-05-08 DIAGNOSIS — E66.01 CLASS 3 SEVERE OBESITY DUE TO EXCESS CALORIES WITH SERIOUS COMORBIDITY AND BODY MASS INDEX (BMI) OF 40.0 TO 44.9 IN ADULT: ICD-10-CM

## 2025-05-08 NOTE — PROGRESS NOTES
"Chief Complaint  Diabetes (NO COMPLAINS HAD LABS )    Subjective        Tatiana Turner presents to [facility]  History of Present Illness    History of Present Illness  The patient presents for diabetes follow-up.    Diabetes Management  She reports stable weight, regular exercise, and general well-being. Currently on Ozempic for diabetes management.  - Alleviating/Aggravating Factors: Regular exercise, Ozempic.  - Timing: Ongoing management.  - Severity: Stable weight and general well-being.    Back Pain  She experiences intermittent back pain, attributed to elliptical use. Previously consulted pain management but postponed further treatment due to personal circumstances. Canceled today's appointment due to her daughter's recent childbirth and plans to reschedule.  - Onset: Intermittent.  - Location: Back.  - Character: Pain attributed to elliptical use.  - Alleviating/Aggravating Factors: Postponed further treatment due to personal circumstances.  - Timing: Intermittent.    Colonoscopy scheduled for 06/2025; advised to discontinue Ozempic one week prior. Mammogram performed at [facility] in 01/2025; Pap smears also completed.    Elevated Cholesterol Levels  Expresses concern about elevated cholesterol levels. Currently on Livalo for management.  - Alleviating/Aggravating Factors: Livalo.  - Severity: Concern about elevated levels.    MEDICATIONS  Current: Ozempic, Livalo       Objective   Vital Signs:  /68   Pulse 84   Temp 97.6 °F (36.4 °C)   Ht 157.5 cm (62\")   Wt 99.8 kg (220 lb)   SpO2 97%   BMI 40.24 kg/m²   Estimated body mass index is 40.24 kg/m² as calculated from the following:    Height as of this encounter: 157.5 cm (62\").    Weight as of this encounter: 99.8 kg (220 lb).            Physical Exam  Vitals and nursing note reviewed.   Constitutional:       General: She is not in acute distress.     Appearance: She is well-developed.   HENT:      Head: " Normocephalic.      Nose: Nose normal.   Cardiovascular:      Rate and Rhythm: Normal rate and regular rhythm.      Heart sounds: Normal heart sounds. No murmur heard.  Pulmonary:      Effort: Pulmonary effort is normal. No respiratory distress.      Breath sounds: Normal breath sounds.   Musculoskeletal:         General: Normal range of motion.   Skin:     General: Skin is warm and dry.      Findings: No rash.   Neurological:      Mental Status: She is alert and oriented to person, place, and time.   Psychiatric:         Behavior: Behavior normal.         Thought Content: Thought content normal.         Judgment: Judgment normal.            Result Review :  The following data was reviewed by: Dawna Jaime MD on 05/08/2025:         TSH Rfx On Abnormal To Free T4 (04/29/2025 08:28)  Lipase (04/29/2025 08:28)  Hemoglobin A1c (04/29/2025 08:28)  Lipid Panel With LDL / HDL Ratio (04/29/2025 08:28)  Comprehensive Metabolic Panel (04/29/2025 08:28)  CBC & Differential (04/29/2025 08:28)       Assessment and Plan   Diagnoses and all orders for this visit:    1. Type 2 diabetes mellitus with diabetic polyneuropathy, without long-term current use of insulin (Primary)  -     TSH Rfx On Abnormal To Free T4; Future  -     Lipase; Future  -     Hemoglobin A1c; Future  -     Lipid Panel With LDL / HDL Ratio; Future  -     Comprehensive Metabolic Panel; Future  -     Microalbumin / Creatinine Urine Ratio - Urine, Clean Catch; Future    2. Class 3 severe obesity due to excess calories with serious comorbidity and body mass index (BMI) of 40.0 to 44.9 in adult    3. Acquired hypothyroidism  -     TSH Rfx On Abnormal To Free T4; Future    4. Mixed hyperlipidemia  -     Lipid Panel With LDL / HDL Ratio; Future  -     Comprehensive Metabolic Panel; Future  -     CBC & Differential; Future    5. Elevated lipase    6. Essential hypertension    7. Diabetes mellitus without complication           Assessment & Plan  1. Diabetes Mellitus,  chronic problem well-controlled.  Complications of diabetic neuropathy  - Well controlled with A1c of 6.0  - On Ozempic 2 mg weekly which is working well from a blood sugar standpoint not as much from a weight loss standpoint  - Advised to stop Ozempic one week prior to colonoscopy in June and restart 24-48 hours post-procedure  - Not needing meds for neuropathy at this time.    2. Hypercholesterolemia chronic problem not well-controlled but difficulty to maintain control as she is statin intolerant  - Cholesterol elevated, LDL improved, triglycerides increased likely due to diet  - On Livalo 4 mg daily which she has been able to tolerate  - Repatha discussed but noted as costly with monthly injections  - Advised to monitor fatty food intake to manage cholesterol and support liver health    3. Back Pain  - Intermittent back pain attributed to elliptical use  - Advised follow-up with pain management specialist to discuss treatments, including injections  - Consider exercise classes    4. Health Maintenance  - Blood pressure normal, weight stable  - Kidney, liver, electrolytes, thyroid function, and blood counts normal  - Pancreas no longer inflamed  - M[facility] performed in January at [facility]; records to be requested  - Colonoscopy scheduled for June; advised to stop Ozempic one week prior    Follow-up  - Follow-up in 4 months      Follow Up   Return in about 4 months (around 9/16/2025), or if symptoms worsen or fail to improve, for Annual Physical with fasting labs prior.  Patient was given instructions and counseling regarding her condition or for health maintenance advice. Please see specific information pulled into the AVS if appropriate.         Dawna Jaime MD      Patient or patient representative verbalized consent for the use of Ambient Listening during the visit with  Dawna Jaime MD for chart documentation. 5/8/2025  16:59 EDT

## 2025-05-15 RX ORDER — PITAVASTATIN CALCIUM 4.18 MG/1
1 TABLET, FILM COATED ORAL DAILY
Qty: 90 TABLET | Refills: 3 | Status: SHIPPED | OUTPATIENT
Start: 2025-05-15

## 2025-06-09 ENCOUNTER — ANESTHESIA EVENT (OUTPATIENT)
Dept: PERIOP | Facility: HOSPITAL | Age: 65
End: 2025-06-09
Payer: COMMERCIAL

## 2025-06-10 ENCOUNTER — ANESTHESIA (OUTPATIENT)
Dept: PERIOP | Facility: HOSPITAL | Age: 65
End: 2025-06-10
Payer: COMMERCIAL

## 2025-06-10 ENCOUNTER — HOSPITAL ENCOUNTER (OUTPATIENT)
Facility: HOSPITAL | Age: 65
Setting detail: HOSPITAL OUTPATIENT SURGERY
Discharge: HOME | End: 2025-06-10
Attending: STUDENT IN AN ORGANIZED HEALTH CARE EDUCATION/TRAINING PROGRAM | Admitting: STUDENT IN AN ORGANIZED HEALTH CARE EDUCATION/TRAINING PROGRAM
Payer: COMMERCIAL

## 2025-06-10 VITALS
HEART RATE: 64 BPM | SYSTOLIC BLOOD PRESSURE: 169 MMHG | WEIGHT: 218.6 LBS | BODY MASS INDEX: 40.23 KG/M2 | HEIGHT: 62 IN | DIASTOLIC BLOOD PRESSURE: 67 MMHG | TEMPERATURE: 97.7 F | RESPIRATION RATE: 14 BRPM | OXYGEN SATURATION: 99 %

## 2025-06-10 DIAGNOSIS — Z86.0100 HISTORY OF COLON POLYPS: ICD-10-CM

## 2025-06-10 LAB — GLUCOSE BLDC GLUCOMTR-MCNC: 131 MG/DL (ref 70–130)

## 2025-06-10 PROCEDURE — 82948 REAGENT STRIP/BLOOD GLUCOSE: CPT

## 2025-06-10 PROCEDURE — 45380 COLONOSCOPY AND BIOPSY: CPT | Performed by: STUDENT IN AN ORGANIZED HEALTH CARE EDUCATION/TRAINING PROGRAM

## 2025-06-10 PROCEDURE — 25010000002 PROPOFOL 200 MG/20ML EMULSION

## 2025-06-10 PROCEDURE — 88305 TISSUE EXAM BY PATHOLOGIST: CPT | Performed by: STUDENT IN AN ORGANIZED HEALTH CARE EDUCATION/TRAINING PROGRAM

## 2025-06-10 PROCEDURE — 25010000002 LIDOCAINE 2% SOLUTION

## 2025-06-10 PROCEDURE — 25810000003 LACTATED RINGERS PER 1000 ML: Performed by: NURSE ANESTHETIST, CERTIFIED REGISTERED

## 2025-06-10 RX ORDER — SODIUM CHLORIDE 9 MG/ML
40 INJECTION, SOLUTION INTRAVENOUS AS NEEDED
Status: DISCONTINUED | OUTPATIENT
Start: 2025-06-10 | End: 2025-06-10 | Stop reason: HOSPADM

## 2025-06-10 RX ORDER — SODIUM CHLORIDE 0.9 % (FLUSH) 0.9 %
10 SYRINGE (ML) INJECTION AS NEEDED
Status: DISCONTINUED | OUTPATIENT
Start: 2025-06-10 | End: 2025-06-10 | Stop reason: HOSPADM

## 2025-06-10 RX ORDER — ONDANSETRON 2 MG/ML
4 INJECTION INTRAMUSCULAR; INTRAVENOUS ONCE AS NEEDED
Status: DISCONTINUED | OUTPATIENT
Start: 2025-06-10 | End: 2025-06-10 | Stop reason: HOSPADM

## 2025-06-10 RX ORDER — PROPOFOL 10 MG/ML
INJECTION, EMULSION INTRAVENOUS AS NEEDED
Status: DISCONTINUED | OUTPATIENT
Start: 2025-06-10 | End: 2025-06-10 | Stop reason: SURG

## 2025-06-10 RX ORDER — LIDOCAINE HYDROCHLORIDE 10 MG/ML
0.5 INJECTION, SOLUTION EPIDURAL; INFILTRATION; INTRACAUDAL; PERINEURAL ONCE AS NEEDED
Status: DISCONTINUED | OUTPATIENT
Start: 2025-06-10 | End: 2025-06-10 | Stop reason: HOSPADM

## 2025-06-10 RX ORDER — SODIUM CHLORIDE, SODIUM LACTATE, POTASSIUM CHLORIDE, CALCIUM CHLORIDE 600; 310; 30; 20 MG/100ML; MG/100ML; MG/100ML; MG/100ML
9 INJECTION, SOLUTION INTRAVENOUS CONTINUOUS
Status: DISCONTINUED | OUTPATIENT
Start: 2025-06-10 | End: 2025-06-10 | Stop reason: HOSPADM

## 2025-06-10 RX ORDER — LIDOCAINE HYDROCHLORIDE 20 MG/ML
INJECTION, SOLUTION INFILTRATION; PERINEURAL AS NEEDED
Status: DISCONTINUED | OUTPATIENT
Start: 2025-06-10 | End: 2025-06-10 | Stop reason: SURG

## 2025-06-10 RX ORDER — SODIUM CHLORIDE 0.9 % (FLUSH) 0.9 %
10 SYRINGE (ML) INJECTION EVERY 12 HOURS SCHEDULED
Status: DISCONTINUED | OUTPATIENT
Start: 2025-06-10 | End: 2025-06-10 | Stop reason: HOSPADM

## 2025-06-10 RX ORDER — SODIUM CHLORIDE, SODIUM LACTATE, POTASSIUM CHLORIDE, CALCIUM CHLORIDE 600; 310; 30; 20 MG/100ML; MG/100ML; MG/100ML; MG/100ML
100 INJECTION, SOLUTION INTRAVENOUS ONCE
Status: DISCONTINUED | OUTPATIENT
Start: 2025-06-10 | End: 2025-06-10 | Stop reason: HOSPADM

## 2025-06-10 RX ADMIN — PROPOFOL 630 MG: 10 INJECTION, EMULSION INTRAVENOUS at 09:18

## 2025-06-10 RX ADMIN — SODIUM CHLORIDE, POTASSIUM CHLORIDE, SODIUM LACTATE AND CALCIUM CHLORIDE: 600; 310; 30; 20 INJECTION, SOLUTION INTRAVENOUS at 09:13

## 2025-06-10 RX ADMIN — LIDOCAINE HYDROCHLORIDE 60 MG: 20 INJECTION, SOLUTION INFILTRATION; PERINEURAL at 09:16

## 2025-06-10 NOTE — H&P
GENERAL SURGERY HISTORY AND PHYSICAL     Summary:    Mrs. Tatiana Turner is a 64 y.o. lady here for screening colonoscopy.     Chief Complaint:    Screening colonoscopy     History of Present Illness:    Mrs. Tatiana Turner is a 64 y.o. lady here for screening colonoscopy.    Denies bleeding, rectal pain, weight loss.     Denies family history of colon cancer.     Allergies:   Allergies   Allergen Reactions    Sulfa Antibiotics Hives    Trimethoprim Sulfate [Trimethoprim] Hives    Atorvastatin Myalgia    Ezetimibe Myalgia    Rosuvastatin Myalgia    Sulfamethoxazole-Trimethoprim Rash       Medications:    Reviewed in Epic       Physical Exam:   Constitutional: Well-developed well-nourished, no acute distress  Eyes: Conjunctiva normal, sclera nonicteric  ENMT: Hearing grossly normal, oral mucosa moist  Neck: Supple, trachea midline  Respiratory: No increased work of breathing, normal inspiratory effort  Cardiovascular: Regular rate, no peripheral edema, no jugular venous distention  Gastrointestinal: Soft, nontender  Skin:  Warm, dry, no rash on visualized skin surfaces  Musculoskeletal: Symmetric strength, normal gait  Psychiatric: Alert and oriented ×3, normal affect          Kelli Baker M.D.  General and Endoscopic Surgery  [facility]    [address], [address]  [place], [place], [address]  P: [phone]  F: [phone]

## 2025-06-10 NOTE — ANESTHESIA PREPROCEDURE EVALUATION
Anesthesia Evaluation     Patient summary reviewed and Nursing notes reviewed   no history of anesthetic complications:   NPO Solid Status: > 8 hours  NPO Liquid Status: > 2 hours           Airway   Mallampati: II  TM distance: >3 FB  Neck ROM: full  Large neck circumference  Dental      Comment: Scattered crowns  Implant x 1 (back top left, unsure which tooth)    Pulmonary     breath sounds clear to auscultation  (+) a smoker Former, cigarettes, COPD (albuterol for rescure (hasn't used in years per patient), symbicort daily),  Cardiovascular   Exercise tolerance: good (4-7 METS)    Rhythm: regular  Rate: normal    (+) hypertension, hyperlipidemia,  carotid artery disease right carotid    ROS comment: Carotid duplex 12/2024:   ·  Right internal carotid artery is occluded.  ·  Antegrade right vertebral flow.  ·  Left internal carotid artery demonstrates a less than 50% stenosis.  ·  Antegrade left vertebral flow.      Neuro/Psych  (+) numbness (B toes)  GI/Hepatic/Renal/Endo    (+) morbid obesity, diabetes mellitus type 2, thyroid problem (synthroid) hypothyroidism and thyroid nodules    ROS Comment: H/o colon polyps  Ozempic 5/28/25      Musculoskeletal     (+) back pain      ROS comment: Note from pain PA 3/25/25:   1. Lumbar facet arthropathy (Primary)  2. Spondylolisthesis of lumbar region  3. Degeneration of intervertebral disc of lumbosacral region with discogenic back pain    Abdominal    Substance History   (+) alcohol use (10 drinks/week)  (-) drug use     OB/GYN          Other        ROS/Med Hx Other: H/o tonsillectomy                Anesthesia Plan    ASA 3     MAC       Anesthetic plan, risks, benefits, and alternatives have been provided, discussed and informed consent has been obtained with: patient.  Pre-procedure education provided  Use of blood products discussed with patient  Consented to blood products.    Plan discussed with CRNA.    CODE STATUS:

## 2025-06-10 NOTE — ANESTHESIA POSTPROCEDURE EVALUATION
Patient: Tatiana Turner    Procedure Summary       Date: 06/10/25 Room / Location: [facility] ENDOSCOPY 2 /  [facility]    Anesthesia Start: 0913 Anesthesia Stop: 0946    Procedure: COLONOSCOPY WITH POLYPECTOMY Diagnosis:       History of colon polyps      (History of colon polyps [Z86.0100])    Surgeons: Kelli Baker MD Provider: Jane Osborne CRNA    Anesthesia Type: MAC ASA Status: 3            Anesthesia Type: MAC    Vitals  Vitals Value Taken Time   /67 06/10/25 10:40   Temp     Pulse 70 06/10/25 10:48   Resp 14 06/10/25 10:40   SpO2 97 % 06/10/25 10:48   Vitals shown include unfiled device data.        Post Anesthesia Care and Evaluation    Patient location during evaluation: bedside  Patient participation: complete - patient participated  Level of consciousness: awake and alert  Pain score: 0  Pain management: adequate    Airway patency: patent  Anesthetic complications: No anesthetic complications  PONV Status: none  Cardiovascular status: acceptable  Respiratory status: acceptable  Hydration status: acceptable

## 2025-06-10 NOTE — OP NOTE
PREOPERATIVE DIAGNOSIS:  Screening    POSTOPERATIVE DIAGNOSIS AND FINDINGS:  Cecal polyp  Right sided diverticulosis    PROCEDURE:  Colonoscopy to cecum with cold biopsy forceps removal of a cecal polyp    SURGEON:  Kelli Ruano MD    ANESTHESIA:  MAC    SPECIMEN(S):  Cecal polyp    DESCRIPTION:  In the decubitus position, a digital rectal exam was performed and was normal. The colonoscope was inserted under direct visualization of the lumen to the cecum, which was confirmed by visualization of the ileocecal valve and appendiceal orifice. The scope was then slowly withdrawn circumferentially examining all mucosal surfaces. Scattered diverticulosis seen in the right colon. A less than 1cm cecal polyp was completely removed with cold biopsy forceps.    The quality of the bowel preparation good.    The patient tolerated the procedure well.    RECOMMENDATION FOR FUTURE SURVEILLANCE:  To be determined based on polyp pathology and issued as separate report.    KELLI RUANO M.D.  General and Endoscopic Surgery  [facility]    [address], [address]  [place], [place], [address]  P: [phone]  F: [phone]

## 2025-06-11 LAB
CYTO UR: NORMAL
LAB AP CASE REPORT: NORMAL
PATH REPORT.FINAL DX SPEC: NORMAL
PATH REPORT.GROSS SPEC: NORMAL

## 2025-06-16 ENCOUNTER — TELEPHONE (OUTPATIENT)
Dept: SURGERY | Facility: CLINIC | Age: 65
End: 2025-06-16
Payer: COMMERCIAL

## 2025-06-16 NOTE — TELEPHONE ENCOUNTER
----- Message from Kelli Ruano sent at 6/12/2025  4:58 PM EDT -----  Regarding: recall  ENDOSCOPY FOLLOW UP NOTE    Could you let Tatiana Turner know that the biopsies from her colonoscopy returned as benign? Silke, could you please place him/her in c-scope recall?    Procedure:  6/10/2025 Colonoscopy to cecum with cold biopsy forceps removal of a cecal polyp    Indication:  screening    Pathology:   Final Diagnosis  1.  Colon, cecum, polypectomy:         A.  Tubular adenoma.    Recommendations:  10 years. Patient info sent for c-scope recall.     KELLI RUANO M.D.  General and Endoscopic Surgery  [facility]    [address], [address]  [place], [place], [address]  P: [phone]  F: [phone]

## 2025-06-16 NOTE — TELEPHONE ENCOUNTER
Called and informed pt of cscope results.  Pt verb understanding.  Placed 10yr recall & updated the HM tab.

## 2025-06-25 DIAGNOSIS — E78.2 MIXED HYPERLIPIDEMIA: ICD-10-CM

## 2025-06-25 RX ORDER — OMEGA-3-ACID ETHYL ESTERS 1 G/1
2 CAPSULE, LIQUID FILLED ORAL EVERY 12 HOURS SCHEDULED
Qty: 360 CAPSULE | Refills: 3 | Status: SHIPPED | OUTPATIENT
Start: 2025-06-25

## 2025-08-15 ENCOUNTER — PATIENT ROUNDING (BHMG ONLY) (OUTPATIENT)
Dept: URGENT CARE | Facility: CLINIC | Age: 65
End: 2025-08-15
Payer: COMMERCIAL

## 2025-08-26 DIAGNOSIS — E11.42 TYPE 2 DIABETES MELLITUS WITH DIABETIC POLYNEUROPATHY, WITHOUT LONG-TERM CURRENT USE OF INSULIN: ICD-10-CM

## 2025-08-26 RX ORDER — SEMAGLUTIDE 2.68 MG/ML
INJECTION, SOLUTION SUBCUTANEOUS
Qty: 9 ML | Refills: 3 | Status: SHIPPED | OUTPATIENT
Start: 2025-08-26

## (undated) DEVICE — BW-412T DISP COMBO CLEANING BRUSH: Brand: SINGLE USE COMBINATION CLEANING BRUSH

## (undated) DEVICE — SOL IRR H2O BO 1000ML STRL

## (undated) DEVICE — ADAPT CLN BIOGUARD AIR/H2O DISP

## (undated) DEVICE — KT ORCA ORCAPOD DISP STRL

## (undated) DEVICE — LINER SURG CANSTR SXN S/RIGD 1500CC

## (undated) DEVICE — HYBRID TUBING/CAP SET FOR OLYMPUS® SCOPES: Brand: ERBE

## (undated) DEVICE — Device

## (undated) DEVICE — VIAL FORMALIN CAP 10P 40ML

## (undated) DEVICE — FRCP BX RADJAW4 NDL 2.8 240CM LG OG BX40